# Patient Record
Sex: MALE | Race: WHITE | NOT HISPANIC OR LATINO | Employment: OTHER | URBAN - METROPOLITAN AREA
[De-identification: names, ages, dates, MRNs, and addresses within clinical notes are randomized per-mention and may not be internally consistent; named-entity substitution may affect disease eponyms.]

---

## 2017-03-13 RX ORDER — LISINOPRIL AND HYDROCHLOROTHIAZIDE 25; 20 MG/1; MG/1
TABLET ORAL
Qty: 90 TABLET | Refills: 0 | Status: SHIPPED | OUTPATIENT
Start: 2017-03-13 | End: 2017-06-18 | Stop reason: SDUPTHER

## 2017-03-30 RX ORDER — PRAVASTATIN SODIUM 20 MG
TABLET ORAL
Qty: 90 TABLET | Refills: 0 | Status: SHIPPED | OUTPATIENT
Start: 2017-03-30 | End: 2017-06-28 | Stop reason: SDUPTHER

## 2017-06-19 DIAGNOSIS — E11.9 TYPE 2 DIABETES MELLITUS WITHOUT COMPLICATION, WITHOUT LONG-TERM CURRENT USE OF INSULIN (HCC): ICD-10-CM

## 2017-06-19 DIAGNOSIS — I10 ESSENTIAL HYPERTENSION: ICD-10-CM

## 2017-06-19 DIAGNOSIS — E78.5 HYPERLIPIDEMIA, UNSPECIFIED HYPERLIPIDEMIA TYPE: ICD-10-CM

## 2017-06-19 RX ORDER — LISINOPRIL AND HYDROCHLOROTHIAZIDE 25; 20 MG/1; MG/1
TABLET ORAL
Qty: 90 TABLET | Refills: 0 | Status: SHIPPED | OUTPATIENT
Start: 2017-06-19 | End: 2017-09-17 | Stop reason: SDUPTHER

## 2017-06-21 LAB
ALBUMIN SERPL-MCNC: 4.5 G/DL (ref 3.5–5.2)
ALBUMIN/GLOB SERPL: 1.5 G/DL
ALP SERPL-CCNC: 38 U/L (ref 39–117)
ALT SERPL-CCNC: 34 U/L (ref 1–41)
AST SERPL-CCNC: 25 U/L (ref 1–40)
BASOPHILS # BLD AUTO: 0.02 10*3/MM3 (ref 0–0.2)
BASOPHILS NFR BLD AUTO: 0.4 % (ref 0–1.5)
BILIRUB SERPL-MCNC: 0.8 MG/DL (ref 0.1–1.2)
BUN SERPL-MCNC: 19 MG/DL (ref 8–23)
BUN/CREAT SERPL: 14.4 (ref 7–25)
CALCIUM SERPL-MCNC: 9.6 MG/DL (ref 8.6–10.5)
CHLORIDE SERPL-SCNC: 99 MMOL/L (ref 98–107)
CHOLEST SERPL-MCNC: 130 MG/DL (ref 100–199)
CO2 SERPL-SCNC: 27.5 MMOL/L (ref 22–29)
CREAT SERPL-MCNC: 1.32 MG/DL (ref 0.76–1.27)
EOSINOPHIL # BLD AUTO: 0.13 10*3/MM3 (ref 0–0.7)
EOSINOPHIL NFR BLD AUTO: 2.5 % (ref 0.3–6.2)
ERYTHROCYTE [DISTWIDTH] IN BLOOD BY AUTOMATED COUNT: 13.2 % (ref 11.5–14.5)
GLOBULIN SER CALC-MCNC: 3 GM/DL
GLUCOSE SERPL-MCNC: 118 MG/DL (ref 65–99)
HBA1C MFR BLD: 6.16 % (ref 4.8–5.6)
HCT VFR BLD AUTO: 44.6 % (ref 40.4–52.2)
HDL SERPL-SCNC: 35 UMOL/L
HDLC SERPL-MCNC: 40 MG/DL
HGB BLD-MCNC: 15.3 G/DL (ref 13.7–17.6)
IMM GRANULOCYTES # BLD: 0 10*3/MM3 (ref 0–0.03)
IMM GRANULOCYTES NFR BLD: 0 % (ref 0–0.5)
LDL SERPL QN: 20.2 NM
LDL SERPL-SCNC: 830 NMOL/L
LDL SMALL SERPL-SCNC: 526 NMOL/L
LDLC SERPL CALC-MCNC: 57 MG/DL (ref 0–99)
LYMPHOCYTES # BLD AUTO: 1.91 10*3/MM3 (ref 0.9–4.8)
LYMPHOCYTES NFR BLD AUTO: 37.1 % (ref 19.6–45.3)
MCH RBC QN AUTO: 31 PG (ref 27–32.7)
MCHC RBC AUTO-ENTMCNC: 34.3 G/DL (ref 32.6–36.4)
MCV RBC AUTO: 90.5 FL (ref 79.8–96.2)
MONOCYTES # BLD AUTO: 0.56 10*3/MM3 (ref 0.2–1.2)
MONOCYTES NFR BLD AUTO: 10.9 % (ref 5–12)
NEUTROPHILS # BLD AUTO: 2.53 10*3/MM3 (ref 1.9–8.1)
NEUTROPHILS NFR BLD AUTO: 49.1 % (ref 42.7–76)
PLATELET # BLD AUTO: 318 10*3/MM3 (ref 140–500)
POTASSIUM SERPL-SCNC: 4.1 MMOL/L (ref 3.5–5.2)
PROT SERPL-MCNC: 7.5 G/DL (ref 6–8.5)
RBC # BLD AUTO: 4.93 10*6/MM3 (ref 4.6–6)
SODIUM SERPL-SCNC: 140 MMOL/L (ref 136–145)
T4 FREE SERPL-MCNC: 1.05 NG/DL (ref 0.93–1.7)
TRIGL SERPL-MCNC: 163 MG/DL (ref 0–149)
TSH SERPL DL<=0.005 MIU/L-ACNC: 5.61 MIU/ML (ref 0.27–4.2)
WBC # BLD AUTO: 5.15 10*3/MM3 (ref 4.5–10.7)

## 2017-06-27 ENCOUNTER — OFFICE VISIT (OUTPATIENT)
Dept: FAMILY MEDICINE CLINIC | Facility: CLINIC | Age: 72
End: 2017-06-27

## 2017-06-27 VITALS
HEIGHT: 72 IN | SYSTOLIC BLOOD PRESSURE: 146 MMHG | WEIGHT: 225.2 LBS | RESPIRATION RATE: 18 BRPM | HEART RATE: 70 BPM | BODY MASS INDEX: 30.5 KG/M2 | DIASTOLIC BLOOD PRESSURE: 70 MMHG

## 2017-06-27 DIAGNOSIS — E11.9 TYPE 2 DIABETES MELLITUS WITHOUT COMPLICATION, WITHOUT LONG-TERM CURRENT USE OF INSULIN (HCC): ICD-10-CM

## 2017-06-27 DIAGNOSIS — R79.89 SERUM CREATININE RAISED: ICD-10-CM

## 2017-06-27 DIAGNOSIS — E78.5 HYPERLIPIDEMIA, UNSPECIFIED HYPERLIPIDEMIA TYPE: ICD-10-CM

## 2017-06-27 DIAGNOSIS — I10 ESSENTIAL HYPERTENSION: Primary | ICD-10-CM

## 2017-06-27 PROCEDURE — 99214 OFFICE O/P EST MOD 30 MIN: CPT | Performed by: INTERNAL MEDICINE

## 2017-06-27 NOTE — PROGRESS NOTES
Subjective   Frank J Collesano is a 72 y.o. male. Patient is here today for   Chief Complaint   Patient presents with   • Diabetes   • Hyperlipidemia   • Hypertension          Vitals:    06/27/17 0949   BP: 146/70   Pulse: 70   Resp: 18       The following portions of the patient's history were reviewed and updated as appropriate: allergies, current medications, past family history, past medical history, past social history, past surgical history and problem list.    Past Medical History:   Diagnosis Date   • Diabetes mellitus    • Hyperlipidemia    • Hypertension       No Known Allergies   Social History     Social History   • Marital status: Single     Spouse name: N/A   • Number of children: N/A   • Years of education: N/A     Occupational History   • Not on file.     Social History Main Topics   • Smoking status: Never Smoker   • Smokeless tobacco: Not on file   • Alcohol use Yes   • Drug use: Not on file   • Sexual activity: Not on file     Other Topics Concern   • Not on file     Social History Narrative        Current Outpatient Prescriptions:   •  lisinopril-hydrochlorothiazide (PRINZIDE,ZESTORETIC) 20-25 MG per tablet, TAKE 1 TABLET DAILY, Disp: 90 tablet, Rfl: 0  •  metFORMIN (GLUCOPHAGE) 500 MG tablet, TAKE 1 TABLET EVERY 12 HOURS WITH FOOD, Disp: 180 tablet, Rfl: 1  •  pravastatin (PRAVACHOL) 20 MG tablet, TAKE 1 TABLET EVERY NIGHT AT BEDTIME, Disp: 90 tablet, Rfl: 0     Objective   History of Present Illness Be is here for a blood pressure and lab follow-up.  He has hypertension, hyperlipidemia, elevated TSH, and type 2 diabetes mellitus.  He feels well as always.  He usually eats healthy and exercises almost on a daily basis.  He sprints an episode of chest pain when he was exercising on May 25 and presented to [facility].  A cardiovascular evaluation included a nuclear stress test which was normal.    Review of Systems   Eyes:        Eye exam in October   Respiratory: Negative.     Cardiovascular:        As in HPI   Neurological: Negative for light-headedness and numbness.   Psychiatric/Behavioral: Negative.        Physical Exam   Constitutional: He appears well-developed and well-nourished.   Neck: No thyromegaly present.   Cardiovascular: Normal rate, regular rhythm and normal heart sounds.    122/68   Pulmonary/Chest: Effort normal and breath sounds normal.   Musculoskeletal: He exhibits no edema.   Neurological: He is alert.   Psychiatric: He has a normal mood and affect.   Vitals reviewed.      ASSESSMENT     Problem List Items Addressed This Visit        Cardiovascular and Mediastinum    Hyperlipidemia    Hypertension - Primary       Endocrine    Diabetes mellitus       Other    Serum creatinine raised          PLAN  Patient Instructions   Your blood pressure is stable.  Fasting blood sugar is mildly elevated at 118 and hemoglobin A1c has decreased to 6.16.  Total cholesterol is 1:30 and triglycerides are slightly elevated at 163.  LDL concentration is ideal at 830.  Thyroid-stimulating hormone level is mildly increased at 5.61 and free T4 is normal at 1.05.  Reviewed and discussed your recent cardiovascular evaluation.  Continue current diet, exercise, and medicines and follow-up in 6 months with labs.      Return in about 6 months (around 12/27/2017) for labsBMP,A1C,URINE MA,TSH.

## 2017-06-27 NOTE — PATIENT INSTRUCTIONS
Your blood pressure is stable.  Fasting blood sugar is mildly elevated at 118 and hemoglobin A1c has decreased to 6.16.  Total cholesterol is 1:30 and triglycerides are slightly elevated at 163.  LDL concentration is ideal at 830.  Thyroid-stimulating hormone level is mildly increased at 5.61 and free T4 is normal at 1.05.  Reviewed and discussed your recent cardiovascular evaluation.  Continue current diet, exercise, and medicines and follow-up in 6 months with labs.

## 2017-06-28 RX ORDER — PRAVASTATIN SODIUM 20 MG
TABLET ORAL
Qty: 90 TABLET | Refills: 1 | Status: SHIPPED | OUTPATIENT
Start: 2017-06-28 | End: 2017-12-25 | Stop reason: SDUPTHER

## 2017-09-18 RX ORDER — LISINOPRIL AND HYDROCHLOROTHIAZIDE 25; 20 MG/1; MG/1
TABLET ORAL
Qty: 90 TABLET | Refills: 0 | Status: SHIPPED | OUTPATIENT
Start: 2017-09-18 | End: 2017-12-17 | Stop reason: SDUPTHER

## 2017-12-14 DIAGNOSIS — I10 ESSENTIAL HYPERTENSION: ICD-10-CM

## 2017-12-14 DIAGNOSIS — E11.9 TYPE 2 DIABETES MELLITUS WITHOUT COMPLICATION, WITHOUT LONG-TERM CURRENT USE OF INSULIN (HCC): ICD-10-CM

## 2017-12-18 RX ORDER — LISINOPRIL AND HYDROCHLOROTHIAZIDE 25; 20 MG/1; MG/1
TABLET ORAL
Qty: 90 TABLET | Refills: 0 | Status: SHIPPED | OUTPATIENT
Start: 2017-12-18 | End: 2018-03-17 | Stop reason: SDUPTHER

## 2017-12-21 LAB
BUN SERPL-MCNC: 19 MG/DL (ref 8–23)
BUN/CREAT SERPL: 14.8 (ref 7–25)
CALCIUM SERPL-MCNC: 9.7 MG/DL (ref 8.6–10.5)
CHLORIDE SERPL-SCNC: 100 MMOL/L (ref 98–107)
CO2 SERPL-SCNC: 29.1 MMOL/L (ref 22–29)
CREAT SERPL-MCNC: 1.28 MG/DL (ref 0.76–1.27)
GLUCOSE SERPL-MCNC: 136 MG/DL (ref 65–99)
HBA1C MFR BLD: 6.38 % (ref 4.8–5.6)
MICROALBUMIN UR-MCNC: 8.4 UG/ML
POTASSIUM SERPL-SCNC: 4.3 MMOL/L (ref 3.5–5.2)
SODIUM SERPL-SCNC: 142 MMOL/L (ref 136–145)
TSH SERPL DL<=0.005 MIU/L-ACNC: 1.66 MIU/ML (ref 0.27–4.2)

## 2017-12-26 RX ORDER — PRAVASTATIN SODIUM 20 MG
TABLET ORAL
Qty: 90 TABLET | Refills: 1 | Status: SHIPPED | OUTPATIENT
Start: 2017-12-26 | End: 2018-06-26 | Stop reason: SDUPTHER

## 2017-12-28 ENCOUNTER — OFFICE VISIT (OUTPATIENT)
Dept: FAMILY MEDICINE CLINIC | Facility: CLINIC | Age: 72
End: 2017-12-28

## 2017-12-28 VITALS
WEIGHT: 225 LBS | HEART RATE: 74 BPM | BODY MASS INDEX: 30.48 KG/M2 | DIASTOLIC BLOOD PRESSURE: 70 MMHG | SYSTOLIC BLOOD PRESSURE: 120 MMHG | HEIGHT: 72 IN | RESPIRATION RATE: 16 BRPM

## 2017-12-28 DIAGNOSIS — E78.5 HYPERLIPIDEMIA, UNSPECIFIED HYPERLIPIDEMIA TYPE: Primary | ICD-10-CM

## 2017-12-28 DIAGNOSIS — I10 ESSENTIAL HYPERTENSION: ICD-10-CM

## 2017-12-28 DIAGNOSIS — E11.9 TYPE 2 DIABETES MELLITUS WITHOUT COMPLICATION, WITHOUT LONG-TERM CURRENT USE OF INSULIN (HCC): ICD-10-CM

## 2017-12-28 PROCEDURE — 99214 OFFICE O/P EST MOD 30 MIN: CPT | Performed by: INTERNAL MEDICINE

## 2017-12-28 NOTE — PATIENT INSTRUCTIONS
Blood pressure is stable.  Fasting blood sugar is 136 and hemoglobin A1c is 6.38.  Serum creatinine is slightly elevated at 1.28 and GFR is 55.  Urine for microalbumin is normal.  Thyroid-stimulating hormone level is normal.  We'll continue current diet, exercise, and medicines.  Discussed importance of getting annual flu vaccinations.

## 2017-12-28 NOTE — PROGRESS NOTES
Subjective   Frank J Collesano is a 72 y.o. male. Patient is here today for   Chief Complaint   Patient presents with   • Diabetes   • Hypertension          Vitals:    12/28/17 0843   BP: 120/70   Pulse: 74   Resp: 16       The following portions of the patient's history were reviewed and updated as appropriate: allergies, current medications, past family history, past medical history, past social history, past surgical history and problem list.    Past Medical History:   Diagnosis Date   • Diabetes mellitus    • Hyperlipidemia    • Hypertension       No Known Allergies   Social History     Social History   • Marital status: Single     Spouse name: N/A   • Number of children: N/A   • Years of education: N/A     Occupational History   • Not on file.     Social History Main Topics   • Smoking status: Never Smoker   • Smokeless tobacco: Not on file   • Alcohol use Yes   • Drug use: Not on file   • Sexual activity: Not on file     Other Topics Concern   • Not on file     Social History Narrative        Current Outpatient Prescriptions:   •  lisinopril-hydrochlorothiazide (PRINZIDE,ZESTORETIC) 20-25 MG per tablet, TAKE 1 TABLET DAILY, Disp: 90 tablet, Rfl: 0  •  metFORMIN (GLUCOPHAGE) 500 MG tablet, TAKE 1 TABLET EVERY 12 HOURS WITH FOOD, Disp: 180 tablet, Rfl: 1  •  pravastatin (PRAVACHOL) 20 MG tablet, TAKE 1 TABLET EVERY NIGHT AT BEDTIME, Disp: 90 tablet, Rfl: 1     Objective   History of Present Illness Be is here for a blood pressure and lab follow-up.  He has hypertension, hyperlipidemia, and type 2 diabetes mellitus.  He eats very healthy and exercises 6 days a week vigorously.  His weight has been stable.  He feels well.  He does monitor his blood pressure reports normal readings.  He refuses flu vaccination.  Other immunizations are up-to-date.    Review of Systems   Constitutional: Negative for activity change and unexpected weight change.   Eyes:        Eye exam is up-to-date   Respiratory: Negative.     Gastrointestinal:        Colonoscopy 2014   Genitourinary: Negative.    Neurological: Negative for weakness and numbness.       Physical Exam   Constitutional: He appears well-developed and well-nourished.   Neck: No thyromegaly present.   Cardiovascular: Normal rate, regular rhythm and normal heart sounds.    Pulmonary/Chest: Effort normal and breath sounds normal.    Diabetic foot exam performed: absent vibratory sensation.    Vascular Status -  His exam exhibits right foot vasculature normal. His exam exhibits left foot vasculature normal.   Skin Integrity  -  His right foot skin is intact.     Be 's left foot skin is intact. .  Psychiatric: He has a normal mood and affect.   Vitals reviewed.      ASSESSMENT     Problem List Items Addressed This Visit        Cardiovascular and Mediastinum    Hyperlipidemia - Primary    Hypertension       Endocrine    Diabetes mellitus          PLAN  Patient Instructions   Blood pressure is stable.  Fasting blood sugar is 136 and hemoglobin A1c is 6.38.  Serum creatinine is slightly elevated at 1.28 and GFR is 55.  Urine for microalbumin is normal.  Thyroid-stimulating hormone level is normal.  We'll continue current diet, exercise, and medicines.  Discussed importance of getting annual flu vaccinations.      Return in about 6 months (around 6/28/2018) for labsCBC,CMP,LIPID,A1C.

## 2018-01-09 ENCOUNTER — OFFICE VISIT (OUTPATIENT)
Dept: FAMILY MEDICINE CLINIC | Facility: CLINIC | Age: 73
End: 2018-01-09

## 2018-01-09 VITALS
SYSTOLIC BLOOD PRESSURE: 119 MMHG | HEART RATE: 81 BPM | TEMPERATURE: 97.3 F | HEIGHT: 72 IN | RESPIRATION RATE: 16 BRPM | OXYGEN SATURATION: 99 % | BODY MASS INDEX: 30.48 KG/M2 | DIASTOLIC BLOOD PRESSURE: 85 MMHG | WEIGHT: 225 LBS

## 2018-01-09 DIAGNOSIS — Z00.00 INITIAL MEDICARE ANNUAL WELLNESS VISIT: Primary | ICD-10-CM

## 2018-01-09 PROCEDURE — G0438 PPPS, INITIAL VISIT: HCPCS | Performed by: NURSE PRACTITIONER

## 2018-01-09 NOTE — PROGRESS NOTES
QUICK REFERENCE INFORMATION:  The ABCs of the Annual Wellness Visit    Initial Medicare Wellness Visit    HEALTH RISK ASSESSMENT    1945    Recent Hospitalizations:  No hospitalization(s) within the last year..        Current Medical Providers:  Patient Care Team:  Garret Dumont MD as PCP - General        Smoking Status:  History   Smoking Status   • Never Smoker   Smokeless Tobacco   • Not on file       Alcohol Consumption:  History   Alcohol Use   • Yes       Depression Screen:   PHQ-2/PHQ-9 Depression Screening 1/9/2018   Little interest or pleasure in doing things 0   Feeling down, depressed, or hopeless 0   Trouble falling or staying asleep, or sleeping too much 0   Feeling tired or having little energy 0   Poor appetite or overeating 0   Feeling bad about yourself - or that you are a failure or have let yourself or your family down 0   Trouble concentrating on things, such as reading the newspaper or watching television 0   Moving or speaking so slowly that other people could have noticed. Or the opposite - being so fidgety or restless that you have been moving around a lot more than usual 0   Thoughts that you would be better off dead, or of hurting yourself in some way 0   Total Score 0   If you checked off any problems, how difficult have these problems made it for you to do your work, take care of things at home, or get along with other people? Not difficult at all       Health Habits and Functional and Cognitive Screening:  Functional & Cognitive Status 1/9/2018   Do you have difficulty preparing food and eating? No   Do you have difficulty bathing yourself, getting dressed or grooming yourself? No   Do you have difficulty using the toilet? No   Do you have difficulty moving around from place to place? No   Do you have trouble with steps or getting out of a bed or a chair? No   In the past year have you fallen or experienced a near fall? No   Current Diet Well Balanced Diet   Dental Exam Up to  date   Eye Exam Up to date   Exercise (times per week) 6 times per week   Current Exercise Activities Include Bicycling Outdoors   Do you need help using the phone?  No   Are you deaf or do you have serious difficulty hearing?  No   Do you need help with transportation? No   Do you need help shopping? No   Do you need help preparing meals?  No   Do you need help with housework?  No   Do you need help with laundry? No   Do you need help taking your medications? No   Do you need help managing money? No   Have you felt unusual stress, anger or loneliness in the last month? No   Who do you live with? Spouse   If you need help, do you have trouble finding someone available to you? No   Have you been bothered in the last four weeks by sexual problems? No   Do you have difficulty concentrating, remembering or making decisions? No           Does the patient have evidence of cognitive impairment? No              Visual Acuity:  No exam data present had eye exam in oct 2017     Age-appropriate Screening Schedule:  Refer to the list below for future screening recommendations based on patient's age, sex and/or medical conditions. Orders for these recommended tests are listed in the plan section. The patient has been provided with a written plan.    Health Maintenance   Topic Date Due   • DIABETIC FOOT EXAM  06/15/2016   • LIPID PANEL  06/20/2018   • HEMOGLOBIN A1C  06/20/2018   • DIABETIC EYE EXAM  10/01/2018   • URINE MICROALBUMIN  12/20/2018   • TDAP/TD VACCINES (2 - Td) 07/07/2024   • COLONOSCOPY  10/15/2024   • INFLUENZA VACCINE  Addressed   • ZOSTER VACCINE  Completed   • PNEUMOCOCCAL VACCINES (65+ LOW/MEDIUM RISK)  Excluded        Subjective   History of Present Illness    Frank J Collesano is a 72 y.o. male who presents for an Annual Wellness Visit.    The following portions of the patient's history were reviewed and updated as appropriate: allergies, current medications, past family history, past medical history, past  "social history, past surgical history and problem list.    Outpatient Medications Prior to Visit   Medication Sig Dispense Refill   • lisinopril-hydrochlorothiazide (PRINZIDE,ZESTORETIC) 20-25 MG per tablet TAKE 1 TABLET DAILY 90 tablet 0   • metFORMIN (GLUCOPHAGE) 500 MG tablet TAKE 1 TABLET EVERY 12 HOURS WITH FOOD 180 tablet 1   • pravastatin (PRAVACHOL) 20 MG tablet TAKE 1 TABLET EVERY NIGHT AT BEDTIME 90 tablet 1     No facility-administered medications prior to visit.        Patient Active Problem List   Diagnosis   • Diabetes mellitus   • Serum creatinine raised   • Hyperlipidemia   • Hypertension   • Suprapubic abdominal pain       Advance Care Planning:  has an advance directive - a copy has been provided and is in file    Identification of Risk Factors:  Risk factors include: cardiovascular risk.    Review of Systems    Compared to one year ago, the patient feels his physical health is the same.  Compared to one year ago, the patient feels his mental health is the same.    Objective     Physical Exam    Vitals:    01/09/18 1326   BP: 119/85   Pulse: 81   Resp: 16   Temp: 97.3 °F (36.3 °C)   TempSrc: Oral   SpO2: 99%   Weight: 102 kg (225 lb)   Height: 182.9 cm (72.01\")   PainSc: 0-No pain       Body mass index is 30.51 kg/(m^2).  Discussed the patient's BMI with him. BMI is above normal parameters. Follow-up plan includes:  no follow-up required.    Assessment/Plan   Patient Self-Management and Personalized Health Advice  The patient has been provided with information about: diet, exercise and prevention of cardiac or vascular disease and preventive services including:   · vascular screenings.         Visit Diagnoses:    ICD-10-CM ICD-9-CM   1. Initial Medicare annual wellness visit Z00.00 V70.0       No orders of the defined types were placed in this encounter.      Outpatient Encounter Prescriptions as of 1/9/2018   Medication Sig Dispense Refill   • lisinopril-hydrochlorothiazide (PRINZIDE,ZESTORETIC) " 20-25 MG per tablet TAKE 1 TABLET DAILY 90 tablet 0   • metFORMIN (GLUCOPHAGE) 500 MG tablet TAKE 1 TABLET EVERY 12 HOURS WITH FOOD 180 tablet 1   • pravastatin (PRAVACHOL) 20 MG tablet TAKE 1 TABLET EVERY NIGHT AT BEDTIME 90 tablet 1     No facility-administered encounter medications on file as of 1/9/2018.        Reviewed use of high risk medication in the elderly: not applicable  Reviewed for potential of harmful drug interactions in the elderly: not applicable    Follow Up:  Follow up as scheduled in June     An After Visit Summary and PPPS with all of these plans were given to the patient.

## 2018-03-19 RX ORDER — LISINOPRIL AND HYDROCHLOROTHIAZIDE 25; 20 MG/1; MG/1
TABLET ORAL
Qty: 90 TABLET | Refills: 0 | Status: SHIPPED | OUTPATIENT
Start: 2018-03-19 | End: 2018-06-16 | Stop reason: SDUPTHER

## 2018-04-23 ENCOUNTER — OFFICE VISIT (OUTPATIENT)
Dept: FAMILY MEDICINE CLINIC | Facility: CLINIC | Age: 73
End: 2018-04-23

## 2018-04-23 VITALS
SYSTOLIC BLOOD PRESSURE: 188 MMHG | RESPIRATION RATE: 18 BRPM | HEIGHT: 72 IN | HEART RATE: 102 BPM | BODY MASS INDEX: 30.61 KG/M2 | WEIGHT: 226 LBS | DIASTOLIC BLOOD PRESSURE: 90 MMHG

## 2018-04-23 DIAGNOSIS — I10 ESSENTIAL HYPERTENSION: Primary | ICD-10-CM

## 2018-04-23 PROCEDURE — 99214 OFFICE O/P EST MOD 30 MIN: CPT | Performed by: INTERNAL MEDICINE

## 2018-04-23 RX ORDER — AMLODIPINE BESYLATE 2.5 MG/1
2.5 TABLET ORAL DAILY
Qty: 30 TABLET | Refills: 5 | Status: SHIPPED | OUTPATIENT
Start: 2018-04-23 | End: 2018-06-26 | Stop reason: SDUPTHER

## 2018-04-23 NOTE — PROGRESS NOTES
Subjective   Frank J Collesano is a 73 y.o. male. Patient is here today for   Chief Complaint   Patient presents with   • Hypertension     BP Check, has been elevated           Vitals:    04/23/18 1528   BP: (!) 188/90   Pulse: 102   Resp: 18     The following portions of the patient's history were reviewed and updated as appropriate: allergies, current medications, past family history, past medical history, past social history, past surgical history and problem list.    Past Medical History:   Diagnosis Date   • Diabetes mellitus    • Hyperlipidemia    • Hypertension       No Known Allergies   Social History     Social History   • Marital status: Single     Spouse name: N/A   • Number of children: N/A   • Years of education: N/A     Occupational History   • Not on file.     Social History Main Topics   • Smoking status: Never Smoker   • Smokeless tobacco: Not on file   • Alcohol use Yes   • Drug use: Unknown   • Sexual activity: Not on file     Other Topics Concern   • Not on file     Social History Narrative   • No narrative on file        Current Outpatient Prescriptions:   •  lisinopril-hydrochlorothiazide (PRINZIDE,ZESTORETIC) 20-25 MG per tablet, TAKE 1 TABLET DAILY, Disp: 90 tablet, Rfl: 0  •  metFORMIN (GLUCOPHAGE) 500 MG tablet, TAKE 1 TABLET EVERY 12 HOURS WITH FOOD, Disp: 180 tablet, Rfl: 1  •  pravastatin (PRAVACHOL) 20 MG tablet, TAKE 1 TABLET EVERY NIGHT AT BEDTIME, Disp: 90 tablet, Rfl: 1  •  amLODIPine (NORVASC) 2.5 MG tablet, Take 1 tablet by mouth Daily., Disp: 30 tablet, Rfl: 5     Objective     History of Present Illness Be has hypertension and is on lisinopril-hydrochlorothiazide 20-25 milligrams daily.  He had an abscess tooth in February and was taking a lot of ibuprofen for about 2 weeks.  His blood pressure and the dentist office was over 200 systolic.  He was in a lot of pain.  Since then he hasn't monitoring his blood pressure closely and reports systolic readings in the 140s up to  160s.  He eats very healthy and continues to exercise regularly.    Review of Systems   Constitutional: Negative for activity change and unexpected weight change.   Respiratory: Negative.    Cardiovascular: Negative for chest pain and leg swelling.   Neurological: Negative for headaches.       Physical Exam   Constitutional: He appears well-developed and well-nourished.   Cardiovascular: Normal rate, regular rhythm and normal heart sounds.    165/80, 160/80   Pulmonary/Chest: Effort normal and breath sounds normal.   Neurological: He is alert.   Psychiatric: He has a normal mood and affect. His behavior is normal. Judgment and thought content normal.   Vitals reviewed.      ASSESSMENT     Problem List Items Addressed This Visit        Cardiovascular and Mediastinum    Hypertension - Primary    Relevant Medications    amLODIPine (NORVASC) 2.5 MG tablet    Other Relevant Orders    Basic Metabolic Panel      Other Visit Diagnoses    None.         PLAN  Patient Instructions   Will check renal functions today.  Continue lisinopril-HCTZ at current dose.  Start amlodipine 2.5 mg daily.  If blood pressure consistently as above 140 increase amlodipine to 5 mg daily.  Continue current diet and exercise.    Return in about 1 week (around 4/30/2018) for Recheck.

## 2018-04-23 NOTE — PATIENT INSTRUCTIONS
Will check renal functions today.  Continue lisinopril-HCTZ at current dose.  Start amlodipine 2.5 mg daily.  If blood pressure consistently as above 140 increase amlodipine to 5 mg daily.  Continue current diet and exercise.

## 2018-04-24 LAB
BUN SERPL-MCNC: 18 MG/DL (ref 8–23)
BUN/CREAT SERPL: 13.7 (ref 7–25)
CALCIUM SERPL-MCNC: 9.6 MG/DL (ref 8.6–10.5)
CHLORIDE SERPL-SCNC: 97 MMOL/L (ref 98–107)
CO2 SERPL-SCNC: 28 MMOL/L (ref 22–29)
CREAT SERPL-MCNC: 1.31 MG/DL (ref 0.76–1.27)
GFR SERPLBLD CREATININE-BSD FMLA CKD-EPI: 54 ML/MIN/1.73
GFR SERPLBLD CREATININE-BSD FMLA CKD-EPI: 65 ML/MIN/1.73
GLUCOSE SERPL-MCNC: 176 MG/DL (ref 65–99)
POTASSIUM SERPL-SCNC: 3.8 MMOL/L (ref 3.5–5.2)
SODIUM SERPL-SCNC: 139 MMOL/L (ref 136–145)

## 2018-04-27 ENCOUNTER — TELEPHONE (OUTPATIENT)
Dept: FAMILY MEDICINE CLINIC | Facility: CLINIC | Age: 73
End: 2018-04-27

## 2018-04-27 NOTE — TELEPHONE ENCOUNTER
CALLED PATIENT, PER DR HOPKINS, LABS LOOK GOOD.       ----- Message from Mary Emmanuel sent at 4/26/2018  2:41 PM EDT -----  WANTING LAB RESULTS     PLEASE CALL [phone]

## 2018-04-30 ENCOUNTER — OFFICE VISIT (OUTPATIENT)
Dept: FAMILY MEDICINE CLINIC | Facility: CLINIC | Age: 73
End: 2018-04-30

## 2018-04-30 VITALS
DIASTOLIC BLOOD PRESSURE: 70 MMHG | RESPIRATION RATE: 16 BRPM | WEIGHT: 221 LBS | BODY MASS INDEX: 29.93 KG/M2 | SYSTOLIC BLOOD PRESSURE: 132 MMHG | HEIGHT: 72 IN | HEART RATE: 68 BPM

## 2018-04-30 DIAGNOSIS — I10 ESSENTIAL HYPERTENSION: Primary | ICD-10-CM

## 2018-04-30 PROCEDURE — 99213 OFFICE O/P EST LOW 20 MIN: CPT | Performed by: INTERNAL MEDICINE

## 2018-04-30 NOTE — PROGRESS NOTES
Subjective   Frank J Collesano is a 73 y.o. male. Patient is here today for   Chief Complaint   Patient presents with   • Hypertension          Vitals:    04/30/18 0948   BP: 132/70   Pulse: 68   Resp: 16     The following portions of the patient's history were reviewed and updated as appropriate: allergies, current medications, past family history, past medical history, past social history, past surgical history and problem list.    Past Medical History:   Diagnosis Date   • Diabetes mellitus    • Hyperlipidemia    • Hypertension       No Known Allergies   Social History     Social History   • Marital status: Single     Spouse name: N/A   • Number of children: N/A   • Years of education: N/A     Occupational History   • Not on file.     Social History Main Topics   • Smoking status: Never Smoker   • Smokeless tobacco: Not on file   • Alcohol use Yes   • Drug use: Unknown   • Sexual activity: Not on file     Other Topics Concern   • Not on file     Social History Narrative   • No narrative on file        Current Outpatient Prescriptions:   •  amLODIPine (NORVASC) 2.5 MG tablet, Take 1 tablet by mouth Daily., Disp: 30 tablet, Rfl: 5  •  lisinopril-hydrochlorothiazide (PRINZIDE,ZESTORETIC) 20-25 MG per tablet, TAKE 1 TABLET DAILY, Disp: 90 tablet, Rfl: 0  •  metFORMIN (GLUCOPHAGE) 500 MG tablet, TAKE 1 TABLET EVERY 12 HOURS WITH FOOD, Disp: 180 tablet, Rfl: 1  •  pravastatin (PRAVACHOL) 20 MG tablet, TAKE 1 TABLET EVERY NIGHT AT BEDTIME, Disp: 90 tablet, Rfl: 1     Objective     History of Present Illness Be is here for blood pressure check.  He has hypertension and is on lisinopril-HCTZ 20-25 milligrams daily.  His blood pressure elevated last week and he was also placed on amlodipine 2.5 mg daily.  Blood pressures improved.  He continues to exercise on a daily basis.    Review of Systems   Constitutional:        5 lb weight loss   Respiratory: Negative.    Neurological: Negative for headaches.       Physical Exam    Constitutional: He appears well-developed and well-nourished.   Cardiovascular: Normal rate, regular rhythm and normal heart sounds.    121/77   Neurological: He is alert.   Psychiatric: He has a normal mood and affect.   Vitals reviewed.      ASSESSMENT     Problem List Items Addressed This Visit        Cardiovascular and Mediastinum    Hypertension - Primary      Other Visit Diagnoses    None.         PLAN  Patient Instructions   Blood pressure has improved.  Continue to monitor blood pressure correctly at home and goal is less than 120/80.  Will increase amlodipine to 5 mg if indicated.  Discussed watching dietary sodium as well.    Return for Next scheduled follow up.

## 2018-06-14 DIAGNOSIS — E11.9 TYPE 2 DIABETES MELLITUS WITHOUT COMPLICATION, WITHOUT LONG-TERM CURRENT USE OF INSULIN (HCC): ICD-10-CM

## 2018-06-14 DIAGNOSIS — E78.5 HYPERLIPIDEMIA, UNSPECIFIED HYPERLIPIDEMIA TYPE: ICD-10-CM

## 2018-06-14 DIAGNOSIS — I10 ESSENTIAL HYPERTENSION: Primary | ICD-10-CM

## 2018-06-18 RX ORDER — LISINOPRIL AND HYDROCHLOROTHIAZIDE 25; 20 MG/1; MG/1
TABLET ORAL
Qty: 90 TABLET | Refills: 1 | Status: SHIPPED | OUTPATIENT
Start: 2018-06-18 | End: 2018-12-17

## 2018-06-19 LAB
ALBUMIN SERPL-MCNC: 4.5 G/DL (ref 3.5–5.2)
ALBUMIN/GLOB SERPL: 1.6 G/DL
ALP SERPL-CCNC: 38 U/L (ref 39–117)
ALT SERPL-CCNC: 19 U/L (ref 1–41)
AST SERPL-CCNC: 18 U/L (ref 1–40)
BASOPHILS # BLD AUTO: 0.01 10*3/MM3 (ref 0–0.2)
BASOPHILS NFR BLD AUTO: 0.2 % (ref 0–1.5)
BILIRUB SERPL-MCNC: 0.8 MG/DL (ref 0.1–1.2)
BUN SERPL-MCNC: 22 MG/DL (ref 8–23)
BUN/CREAT SERPL: 15.9 (ref 7–25)
CALCIUM SERPL-MCNC: 10.2 MG/DL (ref 8.6–10.5)
CHLORIDE SERPL-SCNC: 97 MMOL/L (ref 98–107)
CHOLEST SERPL-MCNC: 126 MG/DL (ref 0–200)
CO2 SERPL-SCNC: 28.6 MMOL/L (ref 22–29)
CREAT SERPL-MCNC: 1.38 MG/DL (ref 0.76–1.27)
EOSINOPHIL # BLD AUTO: 0.09 10*3/MM3 (ref 0–0.7)
EOSINOPHIL NFR BLD AUTO: 1.7 % (ref 0.3–6.2)
ERYTHROCYTE [DISTWIDTH] IN BLOOD BY AUTOMATED COUNT: 13.2 % (ref 11.5–14.5)
GFR SERPLBLD CREATININE-BSD FMLA CKD-EPI: 51 ML/MIN/1.73
GFR SERPLBLD CREATININE-BSD FMLA CKD-EPI: 61 ML/MIN/1.73
GLOBULIN SER CALC-MCNC: 2.9 GM/DL
GLUCOSE SERPL-MCNC: 116 MG/DL (ref 65–99)
HBA1C MFR BLD: 5.9 % (ref 4.8–5.6)
HCT VFR BLD AUTO: 46.5 % (ref 40.4–52.2)
HDLC SERPL-MCNC: 44 MG/DL (ref 40–60)
HGB BLD-MCNC: 15.2 G/DL (ref 13.7–17.6)
IMM GRANULOCYTES # BLD: 0 10*3/MM3 (ref 0–0.03)
IMM GRANULOCYTES NFR BLD: 0 % (ref 0–0.5)
LDLC SERPL CALC-MCNC: 56 MG/DL (ref 0–100)
LDLC/HDLC SERPL: 1.28 {RATIO}
LYMPHOCYTES # BLD AUTO: 1.9 10*3/MM3 (ref 0.9–4.8)
LYMPHOCYTES NFR BLD AUTO: 35.7 % (ref 19.6–45.3)
MCH RBC QN AUTO: 31 PG (ref 27–32.7)
MCHC RBC AUTO-ENTMCNC: 32.7 G/DL (ref 32.6–36.4)
MCV RBC AUTO: 94.9 FL (ref 79.8–96.2)
MONOCYTES # BLD AUTO: 0.63 10*3/MM3 (ref 0.2–1.2)
MONOCYTES NFR BLD AUTO: 11.8 % (ref 5–12)
NEUTROPHILS # BLD AUTO: 2.69 10*3/MM3 (ref 1.9–8.1)
NEUTROPHILS NFR BLD AUTO: 50.6 % (ref 42.7–76)
PLATELET # BLD AUTO: 290 10*3/MM3 (ref 140–500)
POTASSIUM SERPL-SCNC: 4.2 MMOL/L (ref 3.5–5.2)
PROT SERPL-MCNC: 7.4 G/DL (ref 6–8.5)
RBC # BLD AUTO: 4.9 10*6/MM3 (ref 4.6–6)
SODIUM SERPL-SCNC: 138 MMOL/L (ref 136–145)
TRIGL SERPL-MCNC: 128 MG/DL (ref 0–150)
VLDLC SERPL CALC-MCNC: 25.6 MG/DL (ref 5–40)
WBC # BLD AUTO: 5.32 10*3/MM3 (ref 4.5–10.7)

## 2018-06-26 ENCOUNTER — OFFICE VISIT (OUTPATIENT)
Dept: FAMILY MEDICINE CLINIC | Facility: CLINIC | Age: 73
End: 2018-06-26

## 2018-06-26 VITALS
HEIGHT: 72 IN | SYSTOLIC BLOOD PRESSURE: 130 MMHG | WEIGHT: 220 LBS | HEART RATE: 70 BPM | BODY MASS INDEX: 29.8 KG/M2 | DIASTOLIC BLOOD PRESSURE: 74 MMHG | RESPIRATION RATE: 18 BRPM

## 2018-06-26 DIAGNOSIS — E78.5 HYPERLIPIDEMIA, UNSPECIFIED HYPERLIPIDEMIA TYPE: Primary | ICD-10-CM

## 2018-06-26 DIAGNOSIS — E11.9 TYPE 2 DIABETES MELLITUS WITHOUT COMPLICATION, WITHOUT LONG-TERM CURRENT USE OF INSULIN (HCC): ICD-10-CM

## 2018-06-26 DIAGNOSIS — I10 ESSENTIAL HYPERTENSION: ICD-10-CM

## 2018-06-26 PROCEDURE — 99214 OFFICE O/P EST MOD 30 MIN: CPT | Performed by: INTERNAL MEDICINE

## 2018-06-26 RX ORDER — PRAVASTATIN SODIUM 20 MG
20 TABLET ORAL
Qty: 90 TABLET | Refills: 3 | Status: SHIPPED | OUTPATIENT
Start: 2018-06-26 | End: 2019-07-29 | Stop reason: SDUPTHER

## 2018-06-26 RX ORDER — AMLODIPINE BESYLATE 2.5 MG/1
2.5 TABLET ORAL DAILY
Qty: 90 TABLET | Refills: 1 | Status: SHIPPED | OUTPATIENT
Start: 2018-06-26 | End: 2018-06-26 | Stop reason: SDUPTHER

## 2018-06-26 RX ORDER — AMLODIPINE BESYLATE 2.5 MG/1
2.5 TABLET ORAL DAILY
Qty: 90 TABLET | Refills: 3 | Status: SHIPPED | OUTPATIENT
Start: 2018-06-26 | End: 2018-12-17

## 2018-06-26 NOTE — PATIENT INSTRUCTIONS
Blood pressure is stable but suggest that she monitor it different times of the day.  Lipids are excellent.  Fasting blood sugar is mildly elevated at 116 and hemoglobin A1c has improved to 5.9.  Serum creatinine is 1.38.  Continue current diet, exercise, and medicines.

## 2018-06-26 NOTE — PROGRESS NOTES
Subjective   Frank J Collesano is a 73 y.o. male. Patient is here today for   Chief Complaint   Patient presents with   • Diabetes   • Hyperlipidemia   • Hypertension          Vitals:    06/26/18 0801   BP: 130/74   Pulse: 70   Resp: 18       The following portions of the patient's history were reviewed and updated as appropriate: allergies, current medications, past family history, past medical history, past social history, past surgical history and problem list.    Past Medical History:   Diagnosis Date   • Diabetes mellitus    • Hyperlipidemia    • Hypertension       No Known Allergies   Social History     Social History   • Marital status: Single     Spouse name: N/A   • Number of children: N/A   • Years of education: N/A     Occupational History   • Not on file.     Social History Main Topics   • Smoking status: Never Smoker   • Smokeless tobacco: Not on file   • Alcohol use Yes   • Drug use: Unknown   • Sexual activity: Not on file     Other Topics Concern   • Not on file     Social History Narrative   • No narrative on file        Current Outpatient Prescriptions:   •  amLODIPine (NORVASC) 2.5 MG tablet, Take 1 tablet by mouth Daily., Disp: 90 tablet, Rfl: 3  •  lisinopril-hydrochlorothiazide (PRINZIDE,ZESTORETIC) 20-25 MG per tablet, TAKE 1 TABLET DAILY, Disp: 90 tablet, Rfl: 1  •  metFORMIN (GLUCOPHAGE) 500 MG tablet, Take 1 tablet by mouth 2 (Two) Times a Day With Meals., Disp: 180 tablet, Rfl: 3  •  pravastatin (PRAVACHOL) 20 MG tablet, Take 1 tablet by mouth every night at bedtime., Disp: 90 tablet, Rfl: 3     Objective   History of Present Illness rate is here for a blood pressure and lab follow-up.  He has hypertension, hyperlipidemia, type 2 diabetes mellitus.  He feels well.  He eats very healthy and exercises on daily basis.  He has lost some weight in last 6 months.  He monitors his blood pressure reports most systolic readings in the 120s and diastolic readings less than 80.    Review of Systems    Constitutional:        Weight loss 5 lbs   Respiratory: Negative.    Cardiovascular: Negative.    Gastrointestinal: Negative.    Genitourinary: Negative.    Neurological: Negative.    Psychiatric/Behavioral: Negative.        Physical Exam   Constitutional: He appears well-developed and well-nourished.   Cardiovascular: Normal rate, regular rhythm and normal heart sounds.    130/74   Musculoskeletal: He exhibits no edema.   Vitals reviewed.      ASSESSMENT     Problem List Items Addressed This Visit        Cardiovascular and Mediastinum    Hyperlipidemia - Primary    Relevant Medications    pravastatin (PRAVACHOL) 20 MG tablet    Hypertension    Relevant Medications    amLODIPine (NORVASC) 2.5 MG tablet       Endocrine    Diabetes mellitus    Relevant Medications    metFORMIN (GLUCOPHAGE) 500 MG tablet          PLAN  Patient Instructions   Blood pressure is stable but suggest that she monitor it different times of the day.  Lipids are excellent.  Fasting blood sugar is mildly elevated at 116 and hemoglobin A1c has improved to 5.9.  Serum creatinine is 1.38.  Continue current diet, exercise, and medicines.      Return in about 6 months (around 12/26/2018) for labs CMP, A1c, urine MA, TSH, PSA.

## 2018-12-05 DIAGNOSIS — E78.5 HYPERLIPIDEMIA, UNSPECIFIED HYPERLIPIDEMIA TYPE: ICD-10-CM

## 2018-12-05 DIAGNOSIS — Z12.5 SCREENING PSA (PROSTATE SPECIFIC ANTIGEN): Primary | ICD-10-CM

## 2018-12-05 DIAGNOSIS — I10 ESSENTIAL HYPERTENSION: ICD-10-CM

## 2018-12-05 DIAGNOSIS — E11.9 TYPE 2 DIABETES MELLITUS WITHOUT COMPLICATION, WITHOUT LONG-TERM CURRENT USE OF INSULIN (HCC): ICD-10-CM

## 2018-12-11 LAB
ALBUMIN SERPL-MCNC: 4.6 G/DL (ref 3.5–5.2)
ALBUMIN/GLOB SERPL: 1.6 G/DL
ALP SERPL-CCNC: 42 U/L (ref 39–117)
ALT SERPL-CCNC: 29 U/L (ref 1–41)
AST SERPL-CCNC: 21 U/L (ref 1–40)
BILIRUB SERPL-MCNC: 0.6 MG/DL (ref 0.1–1.2)
BUN SERPL-MCNC: 19 MG/DL (ref 8–23)
BUN/CREAT SERPL: 13.8 (ref 7–25)
CALCIUM SERPL-MCNC: 9.9 MG/DL (ref 8.6–10.5)
CHLORIDE SERPL-SCNC: 99 MMOL/L (ref 98–107)
CO2 SERPL-SCNC: 30.3 MMOL/L (ref 22–29)
CREAT SERPL-MCNC: 1.38 MG/DL (ref 0.76–1.27)
GLOBULIN SER CALC-MCNC: 2.8 GM/DL
GLUCOSE SERPL-MCNC: 122 MG/DL (ref 65–99)
HBA1C MFR BLD: 6 % (ref 4.8–5.6)
MICROALBUMIN UR-MCNC: 7.7 UG/ML
POTASSIUM SERPL-SCNC: 4.5 MMOL/L (ref 3.5–5.2)
PROT SERPL-MCNC: 7.4 G/DL (ref 6–8.5)
PSA SERPL-MCNC: 1.12 NG/ML (ref 0–4)
SODIUM SERPL-SCNC: 139 MMOL/L (ref 136–145)
TSH SERPL DL<=0.005 MIU/L-ACNC: 2.59 MIU/ML (ref 0.27–4.2)

## 2018-12-17 ENCOUNTER — OFFICE VISIT (OUTPATIENT)
Dept: FAMILY MEDICINE CLINIC | Facility: CLINIC | Age: 73
End: 2018-12-17

## 2018-12-17 VITALS
SYSTOLIC BLOOD PRESSURE: 160 MMHG | BODY MASS INDEX: 30.2 KG/M2 | DIASTOLIC BLOOD PRESSURE: 82 MMHG | HEART RATE: 74 BPM | RESPIRATION RATE: 16 BRPM | WEIGHT: 223 LBS | HEIGHT: 72 IN

## 2018-12-17 DIAGNOSIS — I10 ESSENTIAL HYPERTENSION: Primary | ICD-10-CM

## 2018-12-17 DIAGNOSIS — E11.9 TYPE 2 DIABETES MELLITUS WITHOUT COMPLICATION, WITHOUT LONG-TERM CURRENT USE OF INSULIN (HCC): ICD-10-CM

## 2018-12-17 DIAGNOSIS — E78.5 HYPERLIPIDEMIA, UNSPECIFIED HYPERLIPIDEMIA TYPE: ICD-10-CM

## 2018-12-17 PROCEDURE — 99214 OFFICE O/P EST MOD 30 MIN: CPT | Performed by: INTERNAL MEDICINE

## 2018-12-17 RX ORDER — LISINOPRIL AND HYDROCHLOROTHIAZIDE 20; 12.5 MG/1; MG/1
1 TABLET ORAL 2 TIMES DAILY
Qty: 180 TABLET | Refills: 3 | Status: SHIPPED | OUTPATIENT
Start: 2018-12-17 | End: 2019-11-24 | Stop reason: SDUPTHER

## 2018-12-17 NOTE — PROGRESS NOTES
Subjective   Frank J Collesano is a 73 y.o. male. Patient is here today for   Chief Complaint   Patient presents with   • Hyperlipidemia   • Hypertension          Vitals:    12/17/18 0916   BP: 160/82   Pulse: 74   Resp: 16       The following portions of the patient's history were reviewed and updated as appropriate: allergies, current medications, past family history, past medical history, past social history, past surgical history and problem list.    Past Medical History:   Diagnosis Date   • Diabetes mellitus (CMS/HCC)    • Hyperlipidemia    • Hypertension       No Known Allergies   Social History     Socioeconomic History   • Marital status: Single     Spouse name: Not on file   • Number of children: Not on file   • Years of education: Not on file   • Highest education level: Not on file   Social Needs   • Financial resource strain: Not on file   • Food insecurity - worry: Not on file   • Food insecurity - inability: Not on file   • Transportation needs - medical: Not on file   • Transportation needs - non-medical: Not on file   Occupational History   • Not on file   Tobacco Use   • Smoking status: Never Smoker   Substance and Sexual Activity   • Alcohol use: Yes   • Drug use: Not on file   • Sexual activity: Not on file   Other Topics Concern   • Not on file   Social History Narrative   • Not on file        Current Outpatient Medications:   •  metFORMIN (GLUCOPHAGE) 500 MG tablet, Take 1 tablet by mouth 2 (Two) Times a Day With Meals., Disp: 180 tablet, Rfl: 3  •  pravastatin (PRAVACHOL) 20 MG tablet, Take 1 tablet by mouth every night at bedtime., Disp: 90 tablet, Rfl: 3  •  lisinopril-hydrochlorothiazide (PRINZIDE,ZESTORETIC) 20-12.5 MG per tablet, Take 1 tablet by mouth 2 (Two) Times a Day., Disp: 180 tablet, Rfl: 3     Objective   History of Present Illness Be is here for a blood pressure and lab follow-up.  He has hypertension, hyperlipidemia, and type 2 diabetes mellitus.  He feels well.  He eats  very healthy and exercises on a daily basis.  His weight is been stable.  He stopped amlodipine a few weeks ago.  His blood pressures have been high.  He continues on lisinopril-hydrochlorothiazide.    Review of Systems   Constitutional: Negative for activity change and unexpected weight change.   Respiratory: Negative.    Cardiovascular:        's/80's   Genitourinary: Negative.    Neurological: Negative.    Psychiatric/Behavioral: Negative.        Physical Exam   Constitutional: He appears well-developed and well-nourished.   Cardiovascular: Normal rate, regular rhythm and normal heart sounds.   158/76,140/80   Pulmonary/Chest: Effort normal and breath sounds normal.   Musculoskeletal: He exhibits no edema.   Neurological: He is alert.   Psychiatric: He has a normal mood and affect. His behavior is normal. Judgment and thought content normal.   Vitals reviewed.      ASSESSMENT     Problem List Items Addressed This Visit        Cardiovascular and Mediastinum    Hyperlipidemia    Hypertension - Primary    Relevant Medications    lisinopril-hydrochlorothiazide (PRINZIDE,ZESTORETIC) 20-12.5 MG per tablet       Endocrine    Diabetes mellitus (CMS/[facility])          PLAN  Patient Instructions   Blood pressures are high.  Will increase lisinopril-HCTZ to 20-12 0.5 mg twice a day.  Continue to monitor blood pressure correctly at home.  We will start another blood pressure medicine in addition to lisinopril-HCTZ if blood pressures remain elevated.  Fasting blood sugar is mildly elevated hemoglobin A1c is stable at 6.0.  Serum creatinine is unchanged.  Urine MA is normal.  Thyroid still running hormone level and prostate-specific antigen are normal.  Continue diet and exercise.      Return in about 6 months (around 6/17/2019) for labs CBC,CMP,LIPID,A1C.

## 2019-03-04 ENCOUNTER — OFFICE VISIT (OUTPATIENT)
Dept: FAMILY MEDICINE CLINIC | Facility: CLINIC | Age: 74
End: 2019-03-04

## 2019-03-04 VITALS
WEIGHT: 222 LBS | HEART RATE: 70 BPM | SYSTOLIC BLOOD PRESSURE: 138 MMHG | DIASTOLIC BLOOD PRESSURE: 80 MMHG | BODY MASS INDEX: 30.07 KG/M2 | HEIGHT: 72 IN | RESPIRATION RATE: 18 BRPM

## 2019-03-04 DIAGNOSIS — R10.30 LOWER ABDOMINAL PAIN: Primary | ICD-10-CM

## 2019-03-04 LAB
BILIRUB BLD-MCNC: NEGATIVE MG/DL
CLARITY, POC: CLEAR
COLOR UR: YELLOW
GLUCOSE UR STRIP-MCNC: NEGATIVE MG/DL
KETONES UR QL: NEGATIVE
LEUKOCYTE EST, POC: NEGATIVE
NITRITE UR-MCNC: NEGATIVE MG/ML
PH UR: 5.5 [PH] (ref 5–8)
PROT UR STRIP-MCNC: NEGATIVE MG/DL
RBC # UR STRIP: ABNORMAL /UL
SP GR UR: 1.02 (ref 1–1.03)
UROBILINOGEN UR QL: NORMAL

## 2019-03-04 PROCEDURE — 85025 COMPLETE CBC W/AUTO DIFF WBC: CPT | Performed by: INTERNAL MEDICINE

## 2019-03-04 PROCEDURE — 99214 OFFICE O/P EST MOD 30 MIN: CPT | Performed by: INTERNAL MEDICINE

## 2019-03-04 PROCEDURE — 81003 URINALYSIS AUTO W/O SCOPE: CPT | Performed by: INTERNAL MEDICINE

## 2019-03-04 NOTE — PATIENT INSTRUCTIONS
Complete blood count today is normal.  Urinalysis shows trace blood.  Will do a microscopic urinalysis.  If pain returns we will consider doing a CT of the abdomen and pelvis.

## 2019-03-04 NOTE — PROGRESS NOTES
Subjective   Frank J Collesano is a 74 y.o. male. Patient is here today for   Chief Complaint   Patient presents with   • Abdominal Pain     LOWER ABDOMEN. BETTER NOW.           Vitals:    03/04/19 0829   BP: 138/80   Pulse: 70   Resp: 18     The following portions of the patient's history were reviewed and updated as appropriate: allergies, current medications, past family history, past medical history, past social history, past surgical history and problem list.    Past Medical History:   Diagnosis Date   • Diabetes mellitus (CMS/[facility])    • Hyperlipidemia    • Hypertension       No Known Allergies   Social History     Socioeconomic History   • Marital status: Single     Spouse name: Not on file   • Number of children: Not on file   • Years of education: Not on file   • Highest education level: Not on file   Social Needs   • Financial resource strain: Not on file   • Food insecurity - worry: Not on file   • Food insecurity - inability: Not on file   • Transportation needs - medical: Not on file   • Transportation needs - non-medical: Not on file   Occupational History   • Not on file   Tobacco Use   • Smoking status: Never Smoker   Substance and Sexual Activity   • Alcohol use: Yes   • Drug use: Not on file   • Sexual activity: Not on file   Other Topics Concern   • Not on file   Social History Narrative   • Not on file        Current Outpatient Medications:   •  lisinopril-hydrochlorothiazide (PRINZIDE,ZESTORETIC) 20-12.5 MG per tablet, Take 1 tablet by mouth 2 (Two) Times a Day., Disp: 180 tablet, Rfl: 3  •  metFORMIN (GLUCOPHAGE) 500 MG tablet, Take 1 tablet by mouth 2 (Two) Times a Day With Meals., Disp: 180 tablet, Rfl: 3  •  pravastatin (PRAVACHOL) 20 MG tablet, Take 1 tablet by mouth every night at bedtime., Disp: 90 tablet, Rfl: 3     Objective     History of Present Illness Be complains of lower abdominal pain that started about a month ago when he was in [place].  The pain was worse when he moved.  The  pain severity was 6 out of 10 at its worst.  He denies fever or any change in his bowel movements.  He also had nocturia a few times last night.  He denies any dysuria or hesitancy.  Colonoscopy in 2014 showed some sigmoid diverticulosis and a few diverticuli in the ascending colon as well.    Review of Systems   Constitutional: Negative for fever.   Gastrointestinal: Positive for abdominal pain. Negative for blood in stool and constipation.   Genitourinary: Negative for difficulty urinating, dysuria, frequency, hematuria and urgency.   Neurological: Negative.    Psychiatric/Behavioral: Negative.        Physical Exam   Constitutional: He appears well-developed and well-nourished.   Abdominal: Soft. Bowel sounds are normal.   There is minimal tenderness of the lower abdomen.  There is no guarding or rebound.  There are no inguinal hernias.   Psychiatric: He has a normal mood and affect. His behavior is normal. Judgment and thought content normal.   Vitals reviewed.      ASSESSMENT     Problem List Items Addressed This Visit        Nervous and Auditory    Lower abdominal pain - Primary    Relevant Orders    POC CBC With / Auto Diff (Completed)    POC Urinalysis Dipstick, Automated (Completed)          PLAN  Patient Instructions   Complete blood count today is normal.  Urinalysis shows trace blood.  Will do a microscopic urinalysis.  If pain returns we will consider doing a CT of the abdomen and pelvis.    Return if symptoms worsen or fail to improve.

## 2019-06-14 DIAGNOSIS — E11.9 TYPE 2 DIABETES MELLITUS WITHOUT COMPLICATION, WITHOUT LONG-TERM CURRENT USE OF INSULIN (HCC): ICD-10-CM

## 2019-06-14 DIAGNOSIS — I10 ESSENTIAL HYPERTENSION: Primary | ICD-10-CM

## 2019-06-14 DIAGNOSIS — E78.5 HYPERLIPIDEMIA, UNSPECIFIED HYPERLIPIDEMIA TYPE: ICD-10-CM

## 2019-06-18 LAB
ALBUMIN SERPL-MCNC: 4.4 G/DL (ref 3.5–5.2)
ALBUMIN/GLOB SERPL: 1.5 G/DL
ALP SERPL-CCNC: 39 U/L (ref 39–117)
ALT SERPL-CCNC: 22 U/L (ref 1–41)
AST SERPL-CCNC: 17 U/L (ref 1–40)
BASOPHILS # BLD AUTO: 0.03 10*3/MM3 (ref 0–0.2)
BASOPHILS NFR BLD AUTO: 0.6 % (ref 0–1.5)
BILIRUB SERPL-MCNC: 0.8 MG/DL (ref 0.2–1.2)
BUN SERPL-MCNC: 20 MG/DL (ref 8–23)
BUN/CREAT SERPL: 15.9 (ref 7–25)
CALCIUM SERPL-MCNC: 9.9 MG/DL (ref 8.6–10.5)
CHLORIDE SERPL-SCNC: 98 MMOL/L (ref 98–107)
CHOLEST SERPL-MCNC: 126 MG/DL (ref 0–200)
CO2 SERPL-SCNC: 29.8 MMOL/L (ref 22–29)
CREAT SERPL-MCNC: 1.26 MG/DL (ref 0.76–1.27)
EOSINOPHIL # BLD AUTO: 0.13 10*3/MM3 (ref 0–0.4)
EOSINOPHIL NFR BLD AUTO: 2.7 % (ref 0.3–6.2)
ERYTHROCYTE [DISTWIDTH] IN BLOOD BY AUTOMATED COUNT: 13 % (ref 12.3–15.4)
GLOBULIN SER CALC-MCNC: 2.9 GM/DL
GLUCOSE SERPL-MCNC: 112 MG/DL (ref 65–99)
HBA1C MFR BLD: 6.3 % (ref 4.8–5.6)
HCT VFR BLD AUTO: 47.2 % (ref 37.5–51)
HDLC SERPL-MCNC: 42 MG/DL (ref 40–60)
HGB BLD-MCNC: 15.6 G/DL (ref 13–17.7)
IMM GRANULOCYTES # BLD AUTO: 0.01 10*3/MM3 (ref 0–0.05)
IMM GRANULOCYTES NFR BLD AUTO: 0.2 % (ref 0–0.5)
LDLC SERPL CALC-MCNC: 57 MG/DL (ref 0–100)
LDLC/HDLC SERPL: 1.35 {RATIO}
LYMPHOCYTES # BLD AUTO: 1.67 10*3/MM3 (ref 0.7–3.1)
LYMPHOCYTES NFR BLD AUTO: 34.4 % (ref 19.6–45.3)
MCH RBC QN AUTO: 31.3 PG (ref 26.6–33)
MCHC RBC AUTO-ENTMCNC: 33.1 G/DL (ref 31.5–35.7)
MCV RBC AUTO: 94.6 FL (ref 79–97)
MONOCYTES # BLD AUTO: 0.64 10*3/MM3 (ref 0.1–0.9)
MONOCYTES NFR BLD AUTO: 13.2 % (ref 5–12)
NEUTROPHILS # BLD AUTO: 2.37 10*3/MM3 (ref 1.7–7)
NEUTROPHILS NFR BLD AUTO: 48.9 % (ref 42.7–76)
NRBC BLD AUTO-RTO: 0 /100 WBC (ref 0–0.2)
PLATELET # BLD AUTO: 323 10*3/MM3 (ref 140–450)
POTASSIUM SERPL-SCNC: 4.5 MMOL/L (ref 3.5–5.2)
PROT SERPL-MCNC: 7.3 G/DL (ref 6–8.5)
RBC # BLD AUTO: 4.99 10*6/MM3 (ref 4.14–5.8)
SODIUM SERPL-SCNC: 139 MMOL/L (ref 136–145)
TRIGL SERPL-MCNC: 137 MG/DL (ref 0–150)
VLDLC SERPL CALC-MCNC: 27.4 MG/DL
WBC # BLD AUTO: 4.85 10*3/MM3 (ref 3.4–10.8)

## 2019-06-20 ENCOUNTER — OFFICE VISIT (OUTPATIENT)
Dept: FAMILY MEDICINE CLINIC | Facility: CLINIC | Age: 74
End: 2019-06-20

## 2019-06-20 VITALS
TEMPERATURE: 97.5 F | RESPIRATION RATE: 17 BRPM | HEART RATE: 69 BPM | OXYGEN SATURATION: 97 % | DIASTOLIC BLOOD PRESSURE: 84 MMHG | BODY MASS INDEX: 29.58 KG/M2 | HEIGHT: 72 IN | WEIGHT: 218.4 LBS | SYSTOLIC BLOOD PRESSURE: 134 MMHG

## 2019-06-20 DIAGNOSIS — R79.89 SERUM CREATININE RAISED: ICD-10-CM

## 2019-06-20 DIAGNOSIS — E11.9 TYPE 2 DIABETES MELLITUS WITHOUT COMPLICATION, WITHOUT LONG-TERM CURRENT USE OF INSULIN (HCC): ICD-10-CM

## 2019-06-20 DIAGNOSIS — I10 ESSENTIAL HYPERTENSION: Primary | ICD-10-CM

## 2019-06-20 DIAGNOSIS — E78.5 HYPERLIPIDEMIA, UNSPECIFIED HYPERLIPIDEMIA TYPE: ICD-10-CM

## 2019-06-20 PROCEDURE — 99214 OFFICE O/P EST MOD 30 MIN: CPT | Performed by: INTERNAL MEDICINE

## 2019-06-20 NOTE — PROGRESS NOTES
Subjective   Frank J Collesano is a 74 y.o. male. Patient is here today for   Chief Complaint   Patient presents with   • Follow-up     hypertension, hyperlipidemia, and diabetes.           Vitals:    06/20/19 1300   BP: 134/84   Pulse: 69   Resp: 17   Temp: 97.5 °F (36.4 °C)   SpO2: 97%       The following portions of the patient's history were reviewed and updated as appropriate: allergies, current medications, past family history, past medical history, past social history, past surgical history and problem list.    Past Medical History:   Diagnosis Date   • Diabetes mellitus (CMS/[facility])    • Hyperlipidemia    • Hypertension       No Known Allergies   Social History     Socioeconomic History   • Marital status: Single     Spouse name: Not on file   • Number of children: Not on file   • Years of education: Not on file   • Highest education level: Not on file   Tobacco Use   • Smoking status: Never Smoker   Substance and Sexual Activity   • Alcohol use: Yes        Current Outpatient Medications:   •  lisinopril-hydrochlorothiazide (PRINZIDE,ZESTORETIC) 20-12.5 MG per tablet, Take 1 tablet by mouth 2 (Two) Times a Day., Disp: 180 tablet, Rfl: 3  •  metFORMIN (GLUCOPHAGE) 500 MG tablet, Take 1 tablet by mouth 2 (Two) Times a Day With Meals., Disp: 180 tablet, Rfl: 3  •  pravastatin (PRAVACHOL) 20 MG tablet, Take 1 tablet by mouth every night at bedtime., Disp: 90 tablet, Rfl: 3     Objective   History of Present Illness Be is here for blood pressure lab follow-up.  He has hypertension, hyperlipidemia, type 2 diabetes mellitus.  He feels well.  He has not been eating as healthy as usual earlier this spring.  He continues to exercise vigorously almost on a daily basis.  He had a lot of allergy symptoms and cough and was taking Benadryl.  His symptoms have improved now.  He does have spring and fall allergies.  His weight is down some from 6 months ago.  He monitors his blood pressure and reports stable readings.  Eye  exams are up-to-date.    Review of Systems   Constitutional: Negative for activity change and unexpected weight change.   Respiratory: Negative.    Cardiovascular:        BP high 120-low 130/<80   Gastrointestinal: Negative.    Genitourinary: Negative.    Psychiatric/Behavioral: Negative.        Physical Exam   Constitutional: He appears well-developed and well-nourished.   Cardiovascular: Normal rate, regular rhythm and normal heart sounds.   118/60   Pulmonary/Chest: Effort normal and breath sounds normal.   Musculoskeletal: He exhibits no edema.   Neurological: He is alert.   Psychiatric: He has a normal mood and affect. His behavior is normal. Judgment and thought content normal.   Vitals reviewed.      ASSESSMENT     Problem List Items Addressed This Visit        Cardiovascular and Mediastinum    Hyperlipidemia    Hypertension - Primary       Endocrine    Diabetes mellitus (CMS/HCC)       Other    Serum creatinine raised          PLAN  Patient Instructions   Blood pressure is excellent today.  Discussed monitoring blood pressure correctly when rested and relaxed.  Fasting blood sugar is mildly elevated and hemoglobin A1c is 6.3.  Lipids are excellent.  Kidney and liver functions are normal.  A complete blood count is normal.  Continue exercise and current medicines.  Suggest taking cetirizine 10 mg daily when allergies symptoms return.      Return in about 6 months (around 12/20/2019) for labsCBC,BMp,A1C,URINE MA.

## 2019-06-20 NOTE — PATIENT INSTRUCTIONS
Blood pressure is excellent today.  Discussed monitoring blood pressure correctly when rested and relaxed.  Fasting blood sugar is mildly elevated and hemoglobin A1c is 6.3.  Lipids are excellent.  Kidney and liver functions are normal.  A complete blood count is normal.  Continue exercise and current medicines.  Suggest taking cetirizine 10 mg daily when allergies symptoms return.

## 2019-07-29 RX ORDER — PRAVASTATIN SODIUM 20 MG
TABLET ORAL
Qty: 90 TABLET | Refills: 3 | Status: SHIPPED | OUTPATIENT
Start: 2019-07-29 | End: 2020-07-23

## 2019-11-25 RX ORDER — LISINOPRIL AND HYDROCHLOROTHIAZIDE 20; 12.5 MG/1; MG/1
TABLET ORAL
Qty: 180 TABLET | Refills: 1 | Status: SHIPPED | OUTPATIENT
Start: 2019-11-25 | End: 2020-05-22

## 2019-12-16 DIAGNOSIS — E11.9 TYPE 2 DIABETES MELLITUS WITHOUT COMPLICATION, WITHOUT LONG-TERM CURRENT USE OF INSULIN (HCC): ICD-10-CM

## 2019-12-16 DIAGNOSIS — E78.5 HYPERLIPIDEMIA, UNSPECIFIED HYPERLIPIDEMIA TYPE: Primary | ICD-10-CM

## 2019-12-16 DIAGNOSIS — I10 ESSENTIAL HYPERTENSION: ICD-10-CM

## 2019-12-18 LAB
ALBUMIN/CREAT UR: 5.7 MG/G CREAT (ref 0–30)
BASOPHILS # BLD AUTO: 0.03 10*3/MM3 (ref 0–0.2)
BASOPHILS NFR BLD AUTO: 0.6 % (ref 0–1.5)
BUN SERPL-MCNC: 21 MG/DL (ref 8–23)
BUN/CREAT SERPL: 16 (ref 7–25)
CALCIUM SERPL-MCNC: 9.6 MG/DL (ref 8.6–10.5)
CHLORIDE SERPL-SCNC: 98 MMOL/L (ref 98–107)
CO2 SERPL-SCNC: 31.4 MMOL/L (ref 22–29)
CREAT SERPL-MCNC: 1.31 MG/DL (ref 0.76–1.27)
CREAT UR-MCNC: 159.1 MG/DL
EOSINOPHIL # BLD AUTO: 0.13 10*3/MM3 (ref 0–0.4)
EOSINOPHIL NFR BLD AUTO: 2.8 % (ref 0.3–6.2)
ERYTHROCYTE [DISTWIDTH] IN BLOOD BY AUTOMATED COUNT: 13 % (ref 12.3–15.4)
GLUCOSE SERPL-MCNC: 135 MG/DL (ref 65–99)
HBA1C MFR BLD: 6.6 % (ref 4.8–5.6)
HCT VFR BLD AUTO: 44.8 % (ref 37.5–51)
HGB BLD-MCNC: 15.5 G/DL (ref 13–17.7)
IMM GRANULOCYTES # BLD AUTO: 0.01 10*3/MM3 (ref 0–0.05)
IMM GRANULOCYTES NFR BLD AUTO: 0.2 % (ref 0–0.5)
LYMPHOCYTES # BLD AUTO: 1.8 10*3/MM3 (ref 0.7–3.1)
LYMPHOCYTES NFR BLD AUTO: 38.3 % (ref 19.6–45.3)
MCH RBC QN AUTO: 30.9 PG (ref 26.6–33)
MCHC RBC AUTO-ENTMCNC: 34.6 G/DL (ref 31.5–35.7)
MCV RBC AUTO: 89.4 FL (ref 79–97)
MICROALBUMIN UR-MCNC: 9.1 UG/ML
MONOCYTES # BLD AUTO: 0.66 10*3/MM3 (ref 0.1–0.9)
MONOCYTES NFR BLD AUTO: 14 % (ref 5–12)
NEUTROPHILS # BLD AUTO: 2.07 10*3/MM3 (ref 1.7–7)
NEUTROPHILS NFR BLD AUTO: 44.1 % (ref 42.7–76)
NRBC BLD AUTO-RTO: 0.2 /100 WBC (ref 0–0.2)
PLATELET # BLD AUTO: 324 10*3/MM3 (ref 140–450)
POTASSIUM SERPL-SCNC: 4.7 MMOL/L (ref 3.5–5.2)
RBC # BLD AUTO: 5.01 10*6/MM3 (ref 4.14–5.8)
SODIUM SERPL-SCNC: 141 MMOL/L (ref 136–145)
WBC # BLD AUTO: 4.7 10*3/MM3 (ref 3.4–10.8)

## 2019-12-20 ENCOUNTER — OFFICE VISIT (OUTPATIENT)
Dept: FAMILY MEDICINE CLINIC | Facility: CLINIC | Age: 74
End: 2019-12-20

## 2019-12-20 VITALS
TEMPERATURE: 97.2 F | BODY MASS INDEX: 31.08 KG/M2 | RESPIRATION RATE: 17 BRPM | HEIGHT: 71 IN | DIASTOLIC BLOOD PRESSURE: 70 MMHG | OXYGEN SATURATION: 98 % | WEIGHT: 222 LBS | HEART RATE: 77 BPM | SYSTOLIC BLOOD PRESSURE: 140 MMHG

## 2019-12-20 DIAGNOSIS — E78.5 HYPERLIPIDEMIA, UNSPECIFIED HYPERLIPIDEMIA TYPE: ICD-10-CM

## 2019-12-20 DIAGNOSIS — I10 ESSENTIAL HYPERTENSION: Primary | ICD-10-CM

## 2019-12-20 DIAGNOSIS — R79.89 SERUM CREATININE RAISED: ICD-10-CM

## 2019-12-20 DIAGNOSIS — E11.9 TYPE 2 DIABETES MELLITUS WITHOUT COMPLICATION, WITHOUT LONG-TERM CURRENT USE OF INSULIN (HCC): ICD-10-CM

## 2019-12-20 PROCEDURE — 99214 OFFICE O/P EST MOD 30 MIN: CPT | Performed by: INTERNAL MEDICINE

## 2019-12-20 NOTE — PATIENT INSTRUCTIONS
Blood pressure is stable.  Fasting blood sugar is elevated and hemoglobin A1c is stable at 6.6.  A complete blood count is normal.  Microalbumin creatinine ratio is normal.  Discussed diet, continue exercise and current medicines.

## 2019-12-20 NOTE — PROGRESS NOTES
Subjective   Frank J Collesano is a 74 y.o. male. Patient is here today for   Chief Complaint   Patient presents with   • Diabetes   • Hyperlipidemia   • Hypertension          Vitals:    12/20/19 0902   BP: 140/70   Pulse: 77   Resp: 17   Temp: 97.2 °F (36.2 °C)   SpO2: 98%     Body mass index is 30.96 kg/m².      The following portions of the patient's history were reviewed and updated as appropriate: allergies, current medications, past family history, past medical history, past social history, past surgical history and problem list.    Past Medical History:   Diagnosis Date   • Diabetes mellitus (CMS/[facility])    • Hyperlipidemia    • Hypertension       No Known Allergies   Social History     Socioeconomic History   • Marital status: Single     Spouse name: Not on file   • Number of children: Not on file   • Years of education: Not on file   • Highest education level: Not on file   Tobacco Use   • Smoking status: Never Smoker   Substance and Sexual Activity   • Alcohol use: Yes   • Drug use: Defer   • Sexual activity: Defer        Current Outpatient Medications:   •  lisinopril-hydrochlorothiazide (PRINZIDE,ZESTORETIC) 20-12.5 MG per tablet, TAKE 1 TABLET TWICE A DAY, Disp: 180 tablet, Rfl: 1  •  metFORMIN (GLUCOPHAGE) 500 MG tablet, Take 1 tablet by mouth 2 (Two) Times a Day With Meals., Disp: 180 tablet, Rfl: 3  •  pravastatin (PRAVACHOL) 20 MG tablet, TAKE 1 TABLET EVERY NIGHT AT BEDTIME, Disp: 90 tablet, Rfl: 3     Objective   History of Present Illness Be is here for blood pressure and lab follow-up.  He has hypertension, hyperlipidemia, and type 2 diabetes mellitus.  He feels well.  He has been eating some sugar.  He continues to exercise on a daily basis.  His weight is unchanged in last 6 months.  He has had some stress with friends passing away.  He monitors his blood pressure reports stable readings.  He had an eye exam in November.  He does not do Accu-Chek readings.  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   Eyes:        Exam in November   Respiratory: Negative.    Cardiovascular:        's/70's   Gastrointestinal: Negative.    Genitourinary: Negative.    Skin:        Annual exam   Neurological: Negative.    Psychiatric/Behavioral:        As in HPI       Physical Exam   Constitutional: He appears well-developed and well-nourished.   Cardiovascular: Normal rate, regular rhythm and normal heart sounds.   125/70   Pulmonary/Chest: Effort normal and breath sounds normal.   Musculoskeletal: He exhibits no edema.    Diabetic foot exam performed: Decreased vibratory sensation both feet.  Neurological: He is alert.   Psychiatric: He has a normal mood and affect. His behavior is normal. Judgment and thought content normal.   Vitals reviewed.      ASSESSMENT     Problem List Items Addressed This Visit        Cardiovascular and Mediastinum    Hyperlipidemia    Hypertension - Primary       Endocrine    Diabetes mellitus (CMS/HCC)       Other    Serum creatinine raised          PLAN  Patient Instructions   Blood pressure is stable.  Fasting blood sugar is elevated and hemoglobin A1c is stable at 6.6.  A complete blood count is normal.  Microalbumin creatinine ratio is normal.  Discussed diet, continue exercise and current medicines.        Return in about 6 months (around 6/20/2020) for labs PSA, CMP, lipid, A1c.

## 2020-05-22 RX ORDER — LISINOPRIL AND HYDROCHLOROTHIAZIDE 20; 12.5 MG/1; MG/1
TABLET ORAL
Qty: 180 TABLET | Refills: 3 | Status: SHIPPED | OUTPATIENT
Start: 2020-05-22 | End: 2021-06-01

## 2020-06-25 ENCOUNTER — PATIENT OUTREACH (OUTPATIENT)
Dept: CASE MANAGEMENT | Facility: OTHER | Age: 75
End: 2020-06-25

## 2020-06-25 NOTE — OUTREACH NOTE
LVM for pt to return call to schedule AWREYNALDO Granda  [facility] Clinical Coordinator  [phone]

## 2020-07-06 DIAGNOSIS — E11.9 TYPE 2 DIABETES MELLITUS WITHOUT COMPLICATION, WITHOUT LONG-TERM CURRENT USE OF INSULIN (HCC): Primary | ICD-10-CM

## 2020-07-06 DIAGNOSIS — I10 ESSENTIAL HYPERTENSION: ICD-10-CM

## 2020-07-06 DIAGNOSIS — Z12.5 ENCOUNTER FOR SCREENING FOR MALIGNANT NEOPLASM OF PROSTATE: ICD-10-CM

## 2020-07-06 DIAGNOSIS — E78.5 HYPERLIPIDEMIA, UNSPECIFIED HYPERLIPIDEMIA TYPE: ICD-10-CM

## 2020-07-20 ENCOUNTER — OFFICE VISIT (OUTPATIENT)
Dept: FAMILY MEDICINE CLINIC | Facility: CLINIC | Age: 75
End: 2020-07-20

## 2020-07-20 VITALS
SYSTOLIC BLOOD PRESSURE: 119 MMHG | RESPIRATION RATE: 18 BRPM | WEIGHT: 219.6 LBS | OXYGEN SATURATION: 94 % | BODY MASS INDEX: 30.74 KG/M2 | TEMPERATURE: 98.2 F | HEART RATE: 85 BPM | HEIGHT: 71 IN | DIASTOLIC BLOOD PRESSURE: 80 MMHG

## 2020-07-20 DIAGNOSIS — S96.911A STRAIN OF RIGHT ANKLE AND FOOT, INITIAL ENCOUNTER: ICD-10-CM

## 2020-07-20 DIAGNOSIS — M77.9 TENDONITIS: Primary | ICD-10-CM

## 2020-07-20 PROCEDURE — 99213 OFFICE O/P EST LOW 20 MIN: CPT | Performed by: NURSE PRACTITIONER

## 2020-07-20 NOTE — PATIENT INSTRUCTIONS
Pt to continue to ice foot/ankle 3 times a day for 10-15 minutes at a time until swelling resolved. Pt to continue to not over use right ankle. Pt informed to use soft ankle brace to help support and use as compression to area. Pt informed to keep elevated when possible. Pt informed of stretching exercises that can be done at home.     Pt returning to office in two weeks for AWV and labs, when he returns should add on uric acid level to evaluate for gout.

## 2020-07-20 NOTE — PROGRESS NOTES
"Frank J Collesano is a 75 y.o.. male.     Pt here today for c/o right ankle Swelling. Pt stating that since July 10 he has had swelling to right foot, ankle and post. Calf. Pt stating he had pain from his heel to ankle to post. Calf. Pt stating he has been using an Ice pack to area and taking Advil as needed. Pt stating that his pain is gone and his swelling is mostly gone now. Pt stating that this happens off and on through out year. Pt denies pain to bottom of foot. Pt denies any accident/trauma. Pt denies any numbness and/or tingling.       The following portions of the patient's history were reviewed and updated as appropriate: allergies, current medications, past family history, past medical history, past social history, past surgical history and problem list.    Past Medical History:   Diagnosis Date   • Diabetes mellitus (CMS/HCC)    • Hyperlipidemia    • Hypertension        Past Surgical History:   Procedure Laterality Date   • APPENDECTOMY     • COLONOSCOPY     • EYE SURGERY         Review of Systems   Constitutional: Negative.  Negative for fever.   Respiratory: Negative.    Cardiovascular: Negative.    Musculoskeletal: Positive for joint swelling (right ankle). Negative for gait problem.   Neurological: Negative for weakness and numbness.       No Known Allergies    Objective     Vitals:    07/20/20 1300   BP: 119/80   Pulse: 85   Resp: 18   Temp: 98.2 °F (36.8 °C)   SpO2: 94%   Weight: 99.6 kg (219 lb 9.6 oz)   Height: 180.3 cm (70.98\")     Body mass index is 30.64 kg/m².    Physical Exam   Constitutional: He is oriented to person, place, and time. He appears well-developed.   HENT:   Head: Normocephalic.   Eyes: Pupils are equal, round, and reactive to light.   Cardiovascular: Normal rate and regular rhythm.   Pulmonary/Chest: Effort normal and breath sounds normal.   Musculoskeletal: Normal range of motion.   Right ankle: trace edema noted, full ROM, no tenderness noted to palpation  Right foot: trace " edema noted, full ROM, no tenderness noted to palpation, cap refill wnl, no redness/warmth/swelling to big toe  Right calf: no tenderness noted, no swelling noted, no bruising noted, no redness or red streaks noted, no warmth noted   Neurological: He is alert and oriented to person, place, and time.   Vitals reviewed.      Current Outpatient Medications:   •  lisinopril-hydrochlorothiazide (PRINZIDE,ZESTORETIC) 20-12.5 MG per tablet, TAKE 1 TABLET TWICE A DAY, Disp: 180 tablet, Rfl: 3  •  metFORMIN (GLUCOPHAGE) 500 MG tablet, Take 1 tablet by mouth 2 (Two) Times a Day With Meals., Disp: 180 tablet, Rfl: 3  •  pravastatin (PRAVACHOL) 20 MG tablet, TAKE 1 TABLET EVERY NIGHT AT BEDTIME, Disp: 90 tablet, Rfl: 3      Assessment/Plan   Be was seen today for joint swelling.    Diagnoses and all orders for this visit:    Tendonitis    Strain of right ankle and foot, initial encounter        Patient Instructions   Pt to continue to ice foot/ankle 3 times a day for 10-15 minutes at a time until swelling resolved. Pt to continue to not over use right ankle. Pt informed to use soft ankle brace to help support and use as compression to area. Pt informed to keep elevated when possible. Pt informed of stretching exercises that can be done at home.     Pt returning to office in two weeks for AWV and labs, when he returns should add on uric acid level to evaluate for gout.       Return if symptoms worsen or fail to improve.     Subjective  Answers for HPI/ROS submitted by the patient on 7/16/2020   What is the primary reason for your visit?: Other  Please describe your symptoms.: Swollen ankle  Have you had these symptoms before?: Yes  How long have you been having these symptoms?: 5-7 days  Please list any medications you are currently taking for this condition.: Advil  Please describe any probable cause for these symptoms. : Achilles  tendon pull

## 2020-07-23 RX ORDER — PRAVASTATIN SODIUM 20 MG
TABLET ORAL
Qty: 90 TABLET | Refills: 3 | Status: SHIPPED | OUTPATIENT
Start: 2020-07-23 | End: 2021-08-31

## 2020-08-03 ENCOUNTER — OFFICE VISIT (OUTPATIENT)
Dept: FAMILY MEDICINE CLINIC | Facility: CLINIC | Age: 75
End: 2020-08-03

## 2020-08-03 VITALS
DIASTOLIC BLOOD PRESSURE: 78 MMHG | SYSTOLIC BLOOD PRESSURE: 120 MMHG | TEMPERATURE: 96.9 F | HEIGHT: 71 IN | WEIGHT: 218.8 LBS | BODY MASS INDEX: 30.63 KG/M2 | OXYGEN SATURATION: 98 % | HEART RATE: 67 BPM | RESPIRATION RATE: 16 BRPM

## 2020-08-03 DIAGNOSIS — Z00.00 MEDICARE ANNUAL WELLNESS VISIT, SUBSEQUENT: Primary | ICD-10-CM

## 2020-08-03 LAB
ALBUMIN SERPL-MCNC: 4.6 G/DL (ref 3.5–5.2)
ALBUMIN/GLOB SERPL: 1.6 G/DL
ALP SERPL-CCNC: 36 U/L (ref 39–117)
ALT SERPL-CCNC: 27 U/L (ref 1–41)
AST SERPL-CCNC: 22 U/L (ref 1–40)
BILIRUB SERPL-MCNC: 0.7 MG/DL (ref 0–1.2)
BUN SERPL-MCNC: 21 MG/DL (ref 8–23)
BUN/CREAT SERPL: 15.3 (ref 7–25)
CALCIUM SERPL-MCNC: 10.3 MG/DL (ref 8.6–10.5)
CHLORIDE SERPL-SCNC: 97 MMOL/L (ref 98–107)
CHOLEST SERPL-MCNC: 137 MG/DL (ref 0–200)
CO2 SERPL-SCNC: 29.8 MMOL/L (ref 22–29)
CREAT SERPL-MCNC: 1.37 MG/DL (ref 0.76–1.27)
GLOBULIN SER CALC-MCNC: 2.8 GM/DL
GLUCOSE SERPL-MCNC: 118 MG/DL (ref 65–99)
HBA1C MFR BLD: 6.3 % (ref 4.8–5.6)
HDLC SERPL-MCNC: 38 MG/DL (ref 40–60)
LDLC SERPL CALC-MCNC: 66 MG/DL (ref 0–100)
LDLC/HDLC SERPL: 1.74 {RATIO}
POTASSIUM SERPL-SCNC: 4.4 MMOL/L (ref 3.5–5.2)
PROT SERPL-MCNC: 7.4 G/DL (ref 6–8.5)
PSA SERPL-MCNC: 1.32 NG/ML (ref 0–4)
SODIUM SERPL-SCNC: 136 MMOL/L (ref 136–145)
TRIGL SERPL-MCNC: 164 MG/DL (ref 0–150)
VLDLC SERPL CALC-MCNC: 32.8 MG/DL (ref 5–40)

## 2020-08-03 PROCEDURE — G0439 PPPS, SUBSEQ VISIT: HCPCS | Performed by: NURSE PRACTITIONER

## 2020-08-03 NOTE — PROGRESS NOTES
The ABCs of the Annual Wellness Visit  Subsequent Medicare Wellness Visit    Chief Complaint   Patient presents with   • Medicare Wellness-subsequent       History of Present Illness:  Frank J Collesano is a 75 y.o. male who presents for a Subsequent Medicare Wellness Visit.  Subjective   HEALTH RISK ASSESSMENT    Recent Hospitalizations:  No hospitalization(s) within the last year.    Current Medical Providers:  Patient Care Team:  Garret Dumont MD as PCP - General    Smoking Status:  Social History     Tobacco Use   Smoking Status Never Smoker   Smokeless Tobacco Never Used       Alcohol Consumption:  Social History     Substance and Sexual Activity   Alcohol Use Yes   Alcohol rarely    Depression Screen:   PHQ-2/PHQ-9 Depression Screening 8/3/2020   Little interest or pleasure in doing things 0   Feeling down, depressed, or hopeless 0   Trouble falling or staying asleep, or sleeping too much -   Feeling tired or having little energy -   Poor appetite or overeating -   Feeling bad about yourself - or that you are a failure or have let yourself or your family down -   Trouble concentrating on things, such as reading the newspaper or watching television -   Moving or speaking so slowly that other people could have noticed. Or the opposite - being so fidgety or restless that you have been moving around a lot more than usual -   Thoughts that you would be better off dead, or of hurting yourself in some way -   Total Score 0   If you checked off any problems, how difficult have these problems made it for you to do your work, take care of things at home, or get along with other people? -       Fall Risk Screen:  STEADI Fall Risk Assessment was completed, and patient is at LOW risk for falls.Assessment completed on:8/3/2020    Health Habits and Functional and Cognitive Screening:  Functional & Cognitive Status 8/3/2020   Do you have difficulty preparing food and eating? No   Do you have difficulty bathing yourself,  getting dressed or grooming yourself? No   Do you have difficulty using the toilet? No   Do you have difficulty moving around from place to place? No   Do you have trouble with steps or getting out of a bed or a chair? No   Current Diet Well Balanced Diet   Dental Exam Up to date   Eye Exam Up to date   Exercise (times per week) 6 times per week   Current Exercise Activities Include Walking   Do you need help using the phone?  No   Are you deaf or do you have serious difficulty hearing?  No   Do you need help with transportation? No   Do you need help shopping? No   Do you need help preparing meals?  No   Do you need help with housework?  No   Do you need help with laundry? No   Do you need help taking your medications? No   Do you need help managing money? No   Do you ever drive or ride in a car without wearing a seat belt? No   Have you felt unusual stress, anger or loneliness in the last month? No   Who do you live with? Spouse   If you need help, do you have trouble finding someone available to you? No   Have you been bothered in the last four weeks by sexual problems? No   Do you have difficulty concentrating, remembering or making decisions? No       Does the patient have evidence of cognitive impairment? No    Asprin use counseling:Does not need ASA (and currently is not on it)    Age-appropriate Screening Schedule:  Refer to the list below for future screening recommendations based on patient's age, sex and/or medical conditions. Orders for these recommended tests are listed in the plan section. The patient has been provided with a written plan.    Health Maintenance   Topic Date Due   • ZOSTER VACCINE (2 of 3) 10/27/2014   • DIABETIC FOOT EXAM  12/28/2018   • LIPID PANEL  06/17/2020   • HEMOGLOBIN A1C  06/17/2020   • INFLUENZA VACCINE  08/01/2020   • DIABETIC EYE EXAM  11/06/2020   • URINE MICROALBUMIN  12/17/2020   • TDAP/TD VACCINES (2 - Td) 07/07/2024   • COLONOSCOPY  10/15/2024          The following  "portions of the patient's history were reviewed and updated as appropriate: allergies, current medications, past family history, past medical history, past social history, past surgical history and problem list.    Outpatient Medications Prior to Visit   Medication Sig Dispense Refill   • lisinopril-hydrochlorothiazide (PRINZIDE,ZESTORETIC) 20-12.5 MG per tablet TAKE 1 TABLET TWICE A  tablet 3   • metFORMIN (GLUCOPHAGE) 500 MG tablet Take 1 tablet by mouth 2 (Two) Times a Day With Meals. 180 tablet 3   • pravastatin (PRAVACHOL) 20 MG tablet TAKE 1 TABLET EVERY NIGHT AT BEDTIME 90 tablet 3     No facility-administered medications prior to visit.        Patient Active Problem List   Diagnosis   • Diabetes mellitus (CMS/[facility])   • Serum creatinine raised   • Hyperlipidemia   • Hypertension   • Suprapubic abdominal pain   • Lower abdominal pain       Advanced Care Planning:  ACP discussion was held with the patient during this visit. Patient has an advance directive (not in EMR), copy requested.    Review of Systems   Constitutional: Negative.    Respiratory: Negative.    Cardiovascular: Negative.    Musculoskeletal:        Feet/ankle swelling off and on, but better than previous       Compared to one year ago, the patient feels his physical health is worse.; feet issues  Compared to one year ago, the patient feels his mental health is better.    Reviewed chart for potential of high risk medication in the elderly: yes  Reviewed chart for potential of harmful drug interactions in the elderly:yes    Objective         Vitals:    08/03/20 0908   BP: 120/78   Pulse: 67   Resp: 16   Temp: 96.9 °F (36.1 °C)   TempSrc: Oral   SpO2: 98%   Weight: 99.2 kg (218 lb 12.8 oz)   Height: 180.3 cm (70.98\")       Body mass index is 30.53 kg/m².  Discussed the patient's BMI with him. The BMI is in the acceptable range.    Physical Exam   Constitutional: He is oriented to person, place, and time. He appears well-developed.   HENT: "   Head: Normocephalic.   Eyes: Pupils are equal, round, and reactive to light.   Cardiovascular: Normal rate and regular rhythm.   Pulmonary/Chest: Effort normal and breath sounds normal.   Musculoskeletal: Normal range of motion.   Neurological: He is alert and oriented to person, place, and time.   Vitals reviewed.            Assessment/Plan   Medicare Risks and Personalized Health Plan  CMS Preventative Services Quick Reference  Advance Directive Discussion  Immunizations Discussed/Encouraged (specific immunizations; Shingrix )    The above risks/problems have been discussed with the patient.  Pertinent information has been shared with the patient in the After Visit Summary.  Follow up plans and orders are seen below in the Assessment/Plan Section.    Diagnoses and all orders for this visit:    1. Medicare annual wellness visit, subsequent (Primary)      Follow Up:  Return for follow up with primary care provider as needed/as recommended .     An After Visit Summary and PPPS were given to the patient.

## 2020-08-10 ENCOUNTER — OFFICE VISIT (OUTPATIENT)
Dept: FAMILY MEDICINE CLINIC | Facility: CLINIC | Age: 75
End: 2020-08-10

## 2020-08-10 VITALS
TEMPERATURE: 97.5 F | RESPIRATION RATE: 17 BRPM | DIASTOLIC BLOOD PRESSURE: 80 MMHG | SYSTOLIC BLOOD PRESSURE: 130 MMHG | HEIGHT: 70 IN | WEIGHT: 218 LBS | OXYGEN SATURATION: 98 % | BODY MASS INDEX: 31.21 KG/M2 | HEART RATE: 74 BPM

## 2020-08-10 DIAGNOSIS — E78.5 HYPERLIPIDEMIA, UNSPECIFIED HYPERLIPIDEMIA TYPE: ICD-10-CM

## 2020-08-10 DIAGNOSIS — I10 ESSENTIAL HYPERTENSION: Primary | ICD-10-CM

## 2020-08-10 DIAGNOSIS — E11.9 TYPE 2 DIABETES MELLITUS WITHOUT COMPLICATION, WITHOUT LONG-TERM CURRENT USE OF INSULIN (HCC): ICD-10-CM

## 2020-08-10 DIAGNOSIS — R79.89 SERUM CREATININE RAISED: ICD-10-CM

## 2020-08-10 PROBLEM — R68.82 DECREASED LIBIDO: Status: ACTIVE | Noted: 2020-08-10

## 2020-08-10 PROCEDURE — 99214 OFFICE O/P EST MOD 30 MIN: CPT | Performed by: INTERNAL MEDICINE

## 2020-08-10 NOTE — PROGRESS NOTES
Subjective   Frank J Collesano is a 75 y.o. male. Patient is here today for   Chief Complaint   Patient presents with   • Hypertension   • Diabetes   • Hyperlipidemia          Vitals:    08/10/20 0911   BP: 130/80   Pulse: 74   Resp: 17   Temp: 97.5 °F (36.4 °C)   SpO2: 98%     Body mass index is 31.28 kg/m².      The following portions of the patient's history were reviewed and updated as appropriate: allergies, current medications, past family history, past medical history, past social history, past surgical history and problem list.    Past Medical History:   Diagnosis Date   • Diabetes mellitus (CMS/[facility])    • Hyperlipidemia    • Hypertension       No Known Allergies   Social History     Socioeconomic History   • Marital status: Single     Spouse name: Not on file   • Number of children: Not on file   • Years of education: Not on file   • Highest education level: Not on file   Tobacco Use   • Smoking status: Never Smoker   • Smokeless tobacco: Never Used   Substance and Sexual Activity   • Alcohol use: Yes   • Drug use: Defer   • Sexual activity: Defer        Current Outpatient Medications:   •  lisinopril-hydrochlorothiazide (PRINZIDE,ZESTORETIC) 20-12.5 MG per tablet, TAKE 1 TABLET TWICE A DAY, Disp: 180 tablet, Rfl: 3  •  metFORMIN (GLUCOPHAGE) 500 MG tablet, Take 1 tablet by mouth 2 (Two) Times a Day With Meals., Disp: 180 tablet, Rfl: 3  •  pravastatin (PRAVACHOL) 20 MG tablet, TAKE 1 TABLET EVERY NIGHT AT BEDTIME, Disp: 90 tablet, Rfl: 3     Objective   History of Present Illness Be is here for blood pressure check and lab follow-up.  He has hypertension, hyperlipidemia, type 2 diabetes mellitus.  He feels well.  He eats healthy and exercises 6 days a week.  His weight is stable.  He monitors his blood pressure and reports normal readings.  He does complain of decreased libido and wonders if anything can be done about it.  He has not had his testosterone levels checked in the past.  He has not had  vascular screening test recently.  Eye exams are up-to-date.    Review of Systems   Constitutional: Positive for activity change.   Respiratory: Negative.    Cardiovascular:         or less   Neurological: Negative.    Psychiatric/Behavioral: Negative.        Physical Exam   Constitutional: He appears well-developed and well-nourished.   Neck: Carotid bruit is not present.   Cardiovascular: Normal rate, regular rhythm and normal heart sounds.   Pulmonary/Chest: Effort normal and breath sounds normal.   Neurological: He is alert.   Psychiatric: He has a normal mood and affect. His behavior is normal. Judgment and thought content normal.   Vitals reviewed.      ASSESSMENT     Problem List Items Addressed This Visit        Cardiovascular and Mediastinum    Hyperlipidemia    Hypertension - Primary       Endocrine    Diabetes mellitus (CMS/HCC)       Other    Serum creatinine raised          PLAN  Patient Instructions   Blood pressure is well controlled.  Total and LDL cholesterol are normal.  HDL cholesterol is low at 38 and triglycerides are mildly elevated.  Fasting blood sugar is elevated and hemoglobin A1c has improved to 6.4.  Prostate-specific antigen is normal.  Suggest getting early morning testosterone level and repeating it if it is low.  Discussed options of testosterone replacement.  Continue diet, exercise, and current medicines.  You need vascular screening tests.      Return in about 6 months (around 2/10/2021) for labs CBC, CMP, lipid, A1c, microalbumin creatinine ratio, TSH, testosterone.  Answers for HPI/ROS submitted by the patient on 8/3/2020   What is the primary reason for your visit?: Physical

## 2020-08-10 NOTE — PATIENT INSTRUCTIONS
Blood pressure is well controlled.  Total and LDL cholesterol are normal.  HDL cholesterol is low at 38 and triglycerides are mildly elevated.  Fasting blood sugar is elevated and hemoglobin A1c has improved to 6.4.  Prostate-specific antigen is normal.  Suggest getting early morning testosterone level and repeating it if it is low.  Discussed options of testosterone replacement.  Continue diet, exercise, and current medicines.  You need vascular screening tests.

## 2020-08-14 DIAGNOSIS — R68.82 DECREASED LIBIDO: Primary | ICD-10-CM

## 2020-08-15 LAB — TESTOST SERPL-MCNC: 620 NG/DL (ref 264–916)

## 2021-02-15 ENCOUNTER — TELEPHONE (OUTPATIENT)
Dept: FAMILY MEDICINE CLINIC | Facility: CLINIC | Age: 76
End: 2021-02-15

## 2021-03-08 ENCOUNTER — TRANSCRIBE ORDERS (OUTPATIENT)
Dept: WOUND CARE | Facility: HOSPITAL | Age: 76
End: 2021-03-08

## 2021-03-08 ENCOUNTER — OFFICE VISIT (OUTPATIENT)
Dept: FAMILY MEDICINE CLINIC | Facility: CLINIC | Age: 76
End: 2021-03-08

## 2021-03-08 VITALS
WEIGHT: 221 LBS | HEIGHT: 70 IN | RESPIRATION RATE: 18 BRPM | SYSTOLIC BLOOD PRESSURE: 140 MMHG | HEART RATE: 83 BPM | OXYGEN SATURATION: 98 % | BODY MASS INDEX: 31.64 KG/M2 | TEMPERATURE: 97.1 F | DIASTOLIC BLOOD PRESSURE: 80 MMHG

## 2021-03-08 DIAGNOSIS — E11.9 TYPE 2 DIABETES MELLITUS WITHOUT COMPLICATION, WITHOUT LONG-TERM CURRENT USE OF INSULIN (HCC): ICD-10-CM

## 2021-03-08 DIAGNOSIS — Z13.6 ENCOUNTER FOR SCREENING FOR VASCULAR DISEASE: Primary | ICD-10-CM

## 2021-03-08 DIAGNOSIS — R79.89 SERUM CREATININE RAISED: ICD-10-CM

## 2021-03-08 DIAGNOSIS — E78.5 HYPERLIPIDEMIA, UNSPECIFIED HYPERLIPIDEMIA TYPE: ICD-10-CM

## 2021-03-08 DIAGNOSIS — I10 ESSENTIAL HYPERTENSION: Primary | ICD-10-CM

## 2021-03-08 PROCEDURE — 99214 OFFICE O/P EST MOD 30 MIN: CPT | Performed by: INTERNAL MEDICINE

## 2021-03-08 NOTE — PATIENT INSTRUCTIONS
Blood pressure is well controlled.  Hemoglobin A1c is 6.3 and unchanged from 6 months ago.  Kidney functions are normal.  Lipids are normal.  Complete blood count is normal.  Thyroid-stimulating hormone level is minimally elevated.  Continue diet, exercise, and current medicines.  We need to obtain emergency room records for review.

## 2021-03-08 NOTE — PROGRESS NOTES
Subjective   Frank J Collesano is a 76 y.o. male. Patient is here today for   Chief Complaint   Patient presents with   • Hypertension   • Med Refill   • Loss of Consciousness      dehydrated          Vitals:    03/08/21 1306   BP: 140/80   Pulse: 83   Resp: 18   Temp: 97.1 °F (36.2 °C)   SpO2: 98%     Body mass index is 31.71 kg/m².      The following portions of the patient's history were reviewed and updated as appropriate: allergies, current medications, past family history, past medical history, past social history, past surgical history and problem list.    Past Medical History:   Diagnosis Date   • Diabetes mellitus (CMS/[facility])    • Hyperlipidemia    • Hypertension       No Known Allergies   Social History     Socioeconomic History   • Marital status: Single     Spouse name: Not on file   • Number of children: Not on file   • Years of education: Not on file   • Highest education level: Not on file   Tobacco Use   • Smoking status: Never Smoker   • Smokeless tobacco: Never Used   Substance and Sexual Activity   • Alcohol use: Yes   • Drug use: Defer   • Sexual activity: Defer        Current Outpatient Medications:   •  lisinopril-hydrochlorothiazide (PRINZIDE,ZESTORETIC) 20-12.5 MG per tablet, TAKE 1 TABLET TWICE A DAY, Disp: 180 tablet, Rfl: 3  •  metFORMIN (GLUCOPHAGE) 500 MG tablet, TAKE 1 TABLET TWICE A DAY WITH MEALS, Disp: 180 tablet, Rfl: 3  •  pravastatin (PRAVACHOL) 20 MG tablet, TAKE 1 TABLET EVERY NIGHT AT BEDTIME, Disp: 90 tablet, Rfl: 3     Objective   History of Present Illness Be is here for blood pressure check and lab follow-up.  He has hypertension, type 2 diabetes mellitus, hyperlipidemia.  He feels well.  He continues to eat healthy and exercises almost on a daily basis.  His weight is stable.  He was in [place] and experienced a syncopal episode.  Emergency room told him he was dehydrated and since then he has been drinking 0 sugar Gatorade.  He states that he was bit by an ant on the  hand a few minutes prior to passing out.  Emergency room records are not available at present.    Review of Systems   Constitutional: Negative for activity change and unexpected weight change.   Eyes:        Exam in November   Respiratory: Negative for cough and shortness of breath.    Cardiovascular:        /80 or less   Gastrointestinal: Negative.    Genitourinary: Negative.    Neurological: Positive for syncope.   Psychiatric/Behavioral: Negative.        Physical Exam  Constitutional:       Appearance: Normal appearance.   Neck:      Vascular: No carotid bruit.   Cardiovascular:      Rate and Rhythm: Normal rate and regular rhythm.      Heart sounds: Normal heart sounds.      Comments: 140/80, 124/74  Pulmonary:      Effort: Pulmonary effort is normal.      Breath sounds: Normal breath sounds.   Musculoskeletal:      Right lower leg: No edema.      Left lower leg: No edema.   Neurological:      Mental Status: He is alert.   Psychiatric:         Mood and Affect: Mood normal.         Behavior: Behavior normal.         Thought Content: Thought content normal.         Judgment: Judgment normal.         ASSESSMENT     Problems Addressed this Visit        Cardiac and Vasculature    Hyperlipidemia    Hypertension - Primary       Endocrine and Metabolic    Diabetes mellitus (CMS/HCC)       Genitourinary and Reproductive     Serum creatinine raised      Diagnoses       Codes Comments    Essential hypertension    -  Primary ICD-10-CM: I10  ICD-9-CM: 401.9     Hyperlipidemia, unspecified hyperlipidemia type     ICD-10-CM: E78.5  ICD-9-CM: 272.4     Type 2 diabetes mellitus without complication, without long-term current use of insulin (CMS/[facility])     ICD-10-CM: E11.9  ICD-9-CM: 250.00     Serum creatinine raised     ICD-10-CM: R79.89  ICD-9-CM: 790.99           PLAN  There are no Patient Instructions on file for this visit.    No follow-ups on file.  Answers for HPI/ROS submitted by the patient on 3/5/2021  What is the  primary reason for your visit?: Physical

## 2021-04-05 ENCOUNTER — HOSPITAL ENCOUNTER (OUTPATIENT)
Dept: CARDIOLOGY | Facility: HOSPITAL | Age: 76
Discharge: HOME | End: 2021-04-05
Admitting: SURGERY

## 2021-04-05 VITALS
BODY MASS INDEX: 28.63 KG/M2 | SYSTOLIC BLOOD PRESSURE: 170 MMHG | DIASTOLIC BLOOD PRESSURE: 72 MMHG | WEIGHT: 216 LBS | HEIGHT: 73 IN | HEART RATE: 68 BPM

## 2021-04-05 DIAGNOSIS — Z13.6 ENCOUNTER FOR SCREENING FOR VASCULAR DISEASE: ICD-10-CM

## 2021-04-05 LAB
BH CV ECHO MEAS - DIST AO DIAM: 1.59 CM
BH CV VAS BP LEFT ARM: NORMAL MMHG
BH CV VAS BP RIGHT ARM: NORMAL MMHG
BH CV XLRA MEAS - MID AO DIAM: 1.8 CM
BH CV XLRA MEAS - PAD LEFT ABI DP: NORMAL
BH CV XLRA MEAS - PAD LEFT ABI PT: NORMAL
BH CV XLRA MEAS - PAD LEFT ARM: 170 MMHG
BH CV XLRA MEAS - PAD LEFT LEG DP: NORMAL MMHG
BH CV XLRA MEAS - PAD LEFT LEG PT: NORMAL MMHG
BH CV XLRA MEAS - PAD RIGHT ABI DP: NORMAL
BH CV XLRA MEAS - PAD RIGHT ABI PT: NORMAL
BH CV XLRA MEAS - PAD RIGHT ARM: 165 MMHG
BH CV XLRA MEAS - PAD RIGHT LEG DP: NORMAL MMHG
BH CV XLRA MEAS - PAD RIGHT LEG PT: NORMAL MMHG
BH CV XLRA MEAS - PROX AO DIAM: 2.44 CM
BH CV XLRA MEAS LEFT ICA/CCA RATIO: 1.14
BH CV XLRA MEAS LEFT MID CCA PSV: NORMAL CM/SEC
BH CV XLRA MEAS LEFT MID ICA PSV: NORMAL CM/SEC
BH CV XLRA MEAS LEFT PROX ECA PSV: NORMAL CM/SEC
BH CV XLRA MEAS RIGHT ICA/CCA RATIO: 1.05
BH CV XLRA MEAS RIGHT MID CCA PSV: NORMAL CM/SEC
BH CV XLRA MEAS RIGHT MID ICA PSV: NORMAL CM/SEC
BH CV XLRA MEAS RIGHT PROX ECA PSV: NORMAL CM/SEC

## 2021-04-05 PROCEDURE — 93799 UNLISTED CV SVC/PROCEDURE: CPT

## 2021-06-01 RX ORDER — LISINOPRIL AND HYDROCHLOROTHIAZIDE 20; 12.5 MG/1; MG/1
TABLET ORAL
Qty: 180 TABLET | Refills: 3 | Status: SHIPPED | OUTPATIENT
Start: 2021-06-01 | End: 2022-05-26

## 2021-08-31 RX ORDER — PRAVASTATIN SODIUM 20 MG
TABLET ORAL
Qty: 90 TABLET | Refills: 3 | Status: SHIPPED | OUTPATIENT
Start: 2021-08-31 | End: 2022-08-26

## 2021-09-09 DIAGNOSIS — Z00.00 WELLNESS EXAMINATION: ICD-10-CM

## 2021-09-09 DIAGNOSIS — Z12.5 ENCOUNTER FOR PROSTATE CANCER SCREENING: ICD-10-CM

## 2021-09-10 LAB
BASOPHILS # BLD AUTO: 0.04 10*3/MM3 (ref 0–0.2)
BASOPHILS NFR BLD AUTO: 0.8 % (ref 0–1.5)
BUN SERPL-MCNC: 19 MG/DL (ref 8–23)
BUN/CREAT SERPL: 15 (ref 7–25)
CALCIUM SERPL-MCNC: 10.1 MG/DL (ref 8.6–10.5)
CHLORIDE SERPL-SCNC: 99 MMOL/L (ref 98–107)
CHOLEST SERPL-MCNC: 140 MG/DL (ref 0–200)
CO2 SERPL-SCNC: 27.5 MMOL/L (ref 22–29)
CREAT SERPL-MCNC: 1.27 MG/DL (ref 0.76–1.27)
EOSINOPHIL # BLD AUTO: 0.1 10*3/MM3 (ref 0–0.4)
EOSINOPHIL NFR BLD AUTO: 1.9 % (ref 0.3–6.2)
ERYTHROCYTE [DISTWIDTH] IN BLOOD BY AUTOMATED COUNT: 12.1 % (ref 12.3–15.4)
GLUCOSE SERPL-MCNC: 116 MG/DL (ref 65–99)
HBA1C MFR BLD: 6.2 % (ref 4.8–5.6)
HCT VFR BLD AUTO: 44.9 % (ref 37.5–51)
HDLC SERPL-MCNC: 40 MG/DL (ref 40–60)
HGB BLD-MCNC: 15.1 G/DL (ref 13–17.7)
IMM GRANULOCYTES # BLD AUTO: 0.01 10*3/MM3 (ref 0–0.05)
IMM GRANULOCYTES NFR BLD AUTO: 0.2 % (ref 0–0.5)
LDLC SERPL CALC-MCNC: 73 MG/DL (ref 0–100)
LDLC/HDLC SERPL: 1.71 {RATIO}
LYMPHOCYTES # BLD AUTO: 1.88 10*3/MM3 (ref 0.7–3.1)
LYMPHOCYTES NFR BLD AUTO: 35.5 % (ref 19.6–45.3)
MCH RBC QN AUTO: 30.7 PG (ref 26.6–33)
MCHC RBC AUTO-ENTMCNC: 33.6 G/DL (ref 31.5–35.7)
MCV RBC AUTO: 91.3 FL (ref 79–97)
MONOCYTES # BLD AUTO: 0.68 10*3/MM3 (ref 0.1–0.9)
MONOCYTES NFR BLD AUTO: 12.9 % (ref 5–12)
NEUTROPHILS # BLD AUTO: 2.58 10*3/MM3 (ref 1.7–7)
NEUTROPHILS NFR BLD AUTO: 48.7 % (ref 42.7–76)
NRBC BLD AUTO-RTO: 0 /100 WBC (ref 0–0.2)
PLATELET # BLD AUTO: 328 10*3/MM3 (ref 140–450)
POTASSIUM SERPL-SCNC: 4.2 MMOL/L (ref 3.5–5.2)
PSA SERPL-MCNC: 1.11 NG/ML (ref 0–4)
RBC # BLD AUTO: 4.92 10*6/MM3 (ref 4.14–5.8)
SODIUM SERPL-SCNC: 138 MMOL/L (ref 136–145)
TRIGL SERPL-MCNC: 158 MG/DL (ref 0–150)
TSH SERPL DL<=0.005 MIU/L-ACNC: 4.59 UIU/ML (ref 0.27–4.2)
VLDLC SERPL CALC-MCNC: 27 MG/DL (ref 5–40)
WBC # BLD AUTO: 5.29 10*3/MM3 (ref 3.4–10.8)

## 2021-09-20 ENCOUNTER — OFFICE VISIT (OUTPATIENT)
Dept: FAMILY MEDICINE CLINIC | Facility: CLINIC | Age: 76
End: 2021-09-20

## 2021-09-20 VITALS
HEART RATE: 78 BPM | BODY MASS INDEX: 28.6 KG/M2 | HEIGHT: 73 IN | RESPIRATION RATE: 18 BRPM | WEIGHT: 215.8 LBS | TEMPERATURE: 96.6 F | DIASTOLIC BLOOD PRESSURE: 74 MMHG | OXYGEN SATURATION: 96 % | SYSTOLIC BLOOD PRESSURE: 132 MMHG

## 2021-09-20 DIAGNOSIS — I10 ESSENTIAL HYPERTENSION: Primary | ICD-10-CM

## 2021-09-20 DIAGNOSIS — E11.9 TYPE 2 DIABETES MELLITUS WITHOUT COMPLICATION, WITHOUT LONG-TERM CURRENT USE OF INSULIN (HCC): ICD-10-CM

## 2021-09-20 DIAGNOSIS — E78.5 HYPERLIPIDEMIA, UNSPECIFIED HYPERLIPIDEMIA TYPE: ICD-10-CM

## 2021-09-20 PROCEDURE — 99214 OFFICE O/P EST MOD 30 MIN: CPT | Performed by: INTERNAL MEDICINE

## 2021-09-20 NOTE — PROGRESS NOTES
Subjective   Frank J Collesano is a 76 y.o. male. Patient is here today for   Chief Complaint   Patient presents with   • Hyperlipidemia     6mo fu    • Diabetes     lab          Vitals:    09/20/21 1431   BP: 132/74   Pulse: 78   Resp: 18   Temp: 96.6 °F (35.9 °C)   SpO2: 96%     Body mass index is 28.85 kg/m².      The following portions of the patient's history were reviewed and updated as appropriate: allergies, current medications, past family history, past medical history, past social history, past surgical history and problem list.    Past Medical History:   Diagnosis Date   • Diabetes mellitus (CMS/[facility])    • Hyperlipidemia    • Hypertension       No Known Allergies   Social History     Socioeconomic History   • Marital status: Single     Spouse name: Not on file   • Number of children: Not on file   • Years of education: Not on file   • Highest education level: Not on file   Tobacco Use   • Smoking status: Never Smoker   • Smokeless tobacco: Never Used   Substance and Sexual Activity   • Alcohol use: Yes   • Drug use: Defer   • Sexual activity: Defer        Current Outpatient Medications:   •  lisinopril-hydrochlorothiazide (PRINZIDE,ZESTORETIC) 20-12.5 MG per tablet, TAKE 1 TABLET TWICE A DAY, Disp: 180 tablet, Rfl: 3  •  metFORMIN (GLUCOPHAGE) 500 MG tablet, TAKE 1 TABLET TWICE A DAY WITH MEALS, Disp: 180 tablet, Rfl: 3  •  pravastatin (PRAVACHOL) 20 MG tablet, TAKE 1 TABLET EVERY NIGHT AT BEDTIME, Disp: 90 tablet, Rfl: 3     Objective   History of Present Illness Be is here for blood pressure check and lab follow-up.  He has hypertension, hyperlipidemia, stable type 2 diabetes mellitus.  He feels well.  He eats very healthy and exercises vigorously on a daily basis.  He has lost weight in the last 6 months.  He monitors his blood pressure and reports normal readings.  He had vascular screening test earlier this year which showed some unilateral carotid artery plaque without stenosis.    Review of  Systems   Constitutional:        Weight loss 6 lbs   Respiratory: Negative.    Cardiovascular: Negative.    Gastrointestinal: Negative.    Genitourinary: Negative.    Musculoskeletal: Positive for back pain.   Neurological: Negative.    Psychiatric/Behavioral: Negative.        Physical Exam  Vitals reviewed.   Constitutional:       Appearance: Normal appearance.   Cardiovascular:      Rate and Rhythm: Normal rate and regular rhythm.      Heart sounds: Normal heart sounds.      Comments: 136/66  Pulmonary:      Effort: Pulmonary effort is normal.      Breath sounds: Normal breath sounds.   Musculoskeletal:      Right lower leg: No edema.      Left lower leg: No edema.   Neurological:      Mental Status: He is alert.   Psychiatric:         Mood and Affect: Mood normal.         Behavior: Behavior normal.         Thought Content: Thought content normal.         Judgment: Judgment normal.         ASSESSMENT blood pressure is high today but stable by your readings.  Lipids are normal.  Fasting glucose is mildly elevated hemoglobin A1c is stable at 6.2.  A complete blood count, kidney and liver functions, thyroid-stimulating hormone level, prostate-specific antigen are normal.  Continue diet, exercise, and current medicines.  Discussed vascular screening test.  Will order a coronary artery calcium CT.     Problems Addressed this Visit        Cardiac and Vasculature    Hyperlipidemia    Hypertension - Primary       Endocrine and Metabolic    Diabetes mellitus (CMS/[facility])      Diagnoses       Codes Comments    Essential hypertension    -  Primary ICD-10-CM: I10  ICD-9-CM: 401.9     Hyperlipidemia, unspecified hyperlipidemia type     ICD-10-CM: E78.5  ICD-9-CM: 272.4     Type 2 diabetes mellitus without complication, without long-term current use of insulin (CMS/[facility])     ICD-10-CM: E11.9  ICD-9-CM: 250.00           PLAN  There are no Patient Instructions on file for this visit.    No follow-ups on file.

## 2021-10-19 ENCOUNTER — TELEPHONE (OUTPATIENT)
Dept: FAMILY MEDICINE CLINIC | Facility: CLINIC | Age: 76
End: 2021-10-19

## 2021-10-19 NOTE — TELEPHONE ENCOUNTER
Caller: Collesano, Frank J    Relationship: Self    Best call back number: [phone]    What is the best time to reach you: ANYTIME    Who are you requesting to speak with (clinical staff, provider,  specific staff member):   CLINICAL STAFF/DR HOPKINS MEDICAL ASSISTANT    What was the call regarding: WOULD LIKE TO SPEAK WITH DR HOPKINS OR HIS MEDICAL ASSISTANT  ABOUT THE IMAGING SCAN HE HAS COMING UP NEXT WEEK.     Do you require a callback: YES

## 2022-03-14 DIAGNOSIS — I10 HYPERTENSION, UNSPECIFIED TYPE: ICD-10-CM

## 2022-03-14 DIAGNOSIS — E78.5 HYPERLIPIDEMIA, UNSPECIFIED HYPERLIPIDEMIA TYPE: Primary | ICD-10-CM

## 2022-03-14 DIAGNOSIS — E11.9 TYPE 2 DIABETES MELLITUS WITHOUT COMPLICATION, WITHOUT LONG-TERM CURRENT USE OF INSULIN: ICD-10-CM

## 2022-03-18 LAB
ALBUMIN/CREAT UR: 2 MG/G CREAT (ref 0–29)
BUN SERPL-MCNC: 21 MG/DL (ref 8–27)
BUN/CREAT SERPL: 16 (ref 10–24)
CALCIUM SERPL-MCNC: 9.6 MG/DL (ref 8.6–10.2)
CHLORIDE SERPL-SCNC: 99 MMOL/L (ref 96–106)
CHOLEST SERPL-MCNC: 119 MG/DL (ref 100–199)
CO2 SERPL-SCNC: 25 MMOL/L (ref 20–29)
CREAT SERPL-MCNC: 1.35 MG/DL (ref 0.76–1.27)
CREAT UR-MCNC: 176.7 MG/DL
EGFRCR SERPLBLD CKD-EPI 2021: 54 ML/MIN/1.73
GLUCOSE SERPL-MCNC: 136 MG/DL (ref 65–99)
HBA1C MFR BLD: 6.5 % (ref 4.8–5.6)
HDLC SERPL-MCNC: 39 MG/DL
LDLC SERPL CALC-MCNC: 55 MG/DL (ref 0–99)
LDLC/HDLC SERPL: 1.4 RATIO (ref 0–3.6)
MICROALBUMIN UR-MCNC: 3.7 UG/ML
POTASSIUM SERPL-SCNC: 4.5 MMOL/L (ref 3.5–5.2)
SODIUM SERPL-SCNC: 139 MMOL/L (ref 134–144)
TRIGL SERPL-MCNC: 147 MG/DL (ref 0–149)
VLDLC SERPL CALC-MCNC: 25 MG/DL (ref 5–40)

## 2022-03-22 ENCOUNTER — OFFICE VISIT (OUTPATIENT)
Dept: FAMILY MEDICINE CLINIC | Facility: CLINIC | Age: 77
End: 2022-03-22

## 2022-03-22 VITALS
HEART RATE: 75 BPM | HEIGHT: 73 IN | OXYGEN SATURATION: 98 % | RESPIRATION RATE: 18 BRPM | SYSTOLIC BLOOD PRESSURE: 140 MMHG | BODY MASS INDEX: 28.55 KG/M2 | TEMPERATURE: 96.9 F | DIASTOLIC BLOOD PRESSURE: 80 MMHG | WEIGHT: 215.4 LBS

## 2022-03-22 DIAGNOSIS — R22.1 MASS OF RIGHT SIDE OF NECK: ICD-10-CM

## 2022-03-22 DIAGNOSIS — E78.5 HYPERLIPIDEMIA, UNSPECIFIED HYPERLIPIDEMIA TYPE: ICD-10-CM

## 2022-03-22 DIAGNOSIS — E11.9 TYPE 2 DIABETES MELLITUS WITHOUT COMPLICATION, WITHOUT LONG-TERM CURRENT USE OF INSULIN: ICD-10-CM

## 2022-03-22 DIAGNOSIS — I10 PRIMARY HYPERTENSION: Primary | ICD-10-CM

## 2022-03-22 PROCEDURE — G0439 PPPS, SUBSEQ VISIT: HCPCS | Performed by: INTERNAL MEDICINE

## 2022-03-22 PROCEDURE — 99214 OFFICE O/P EST MOD 30 MIN: CPT | Performed by: INTERNAL MEDICINE

## 2022-03-22 PROCEDURE — 1170F FXNL STATUS ASSESSED: CPT | Performed by: INTERNAL MEDICINE

## 2022-03-22 PROCEDURE — 1159F MED LIST DOCD IN RCRD: CPT | Performed by: INTERNAL MEDICINE

## 2022-03-22 RX ORDER — ALPRAZOLAM 1 MG/1
1 TABLET ORAL 2 TIMES DAILY PRN
Qty: 10 TABLET | Refills: 0 | Status: SHIPPED | OUTPATIENT
Start: 2022-03-22 | End: 2022-04-14 | Stop reason: SDUPTHER

## 2022-03-22 NOTE — PROGRESS NOTES
Subjective   Frank J Collesano is a 77 y.o. male. Patient is here today for   Chief Complaint   Patient presents with   • Medicare Wellness-subsequent     PT HERE FOR AWV AND F/U LABS - ALSO HAS A KNOT ON RIGHT SIDE OF NECK HE WANTS LOOKED AT           Vitals:    03/22/22 1317   BP: 140/80   Pulse: 75   Resp: 18   Temp: 96.9 °F (36.1 °C)   SpO2: 98%     Body mass index is 28.8 kg/m².    The following portions of the patient's history were reviewed and updated as appropriate: allergies, current medications, past family history, past medical history, past social history, past surgical history and problem list.    Past Medical History:   Diagnosis Date   • Diabetes mellitus (HCC)    • Hyperlipidemia    • Hypertension       No Known Allergies   Social History     Socioeconomic History   • Marital status: Single   Tobacco Use   • Smoking status: Never Smoker   • Smokeless tobacco: Never Used   Substance and Sexual Activity   • Alcohol use: Yes   • Drug use: Defer   • Sexual activity: Defer        Current Outpatient Medications:   •  lisinopril-hydrochlorothiazide (PRINZIDE,ZESTORETIC) 20-12.5 MG per tablet, TAKE 1 TABLET TWICE A DAY, Disp: 180 tablet, Rfl: 3  •  metFORMIN (GLUCOPHAGE) 500 MG tablet, TAKE 1 TABLET TWICE A DAY WITH MEALS, Disp: 180 tablet, Rfl: 3  •  pravastatin (PRAVACHOL) 20 MG tablet, TAKE 1 TABLET EVERY NIGHT AT BEDTIME, Disp: 90 tablet, Rfl: 3     Objective     History of Present Illness     Review of Systems    Physical Exam    ASSESSMENT     Problems Addressed this Visit        Cardiac and Vasculature    Hyperlipidemia    Hypertension - Primary       Endocrine and Metabolic    Diabetes mellitus (HCC)      Diagnoses       Codes Comments    Primary hypertension    -  Primary ICD-10-CM: I10  ICD-9-CM: 401.9     Hyperlipidemia, unspecified hyperlipidemia type     ICD-10-CM: E78.5  ICD-9-CM: 272.4     Type 2 diabetes mellitus without complication, without long-term current use of insulin (HCC)      ICD-10-CM: E11.9  ICD-9-CM: 250.00           PLAN  There are no Patient Instructions on file for this visit.  No follow-ups on file.

## 2022-03-22 NOTE — PROGRESS NOTES
The ABCs of the Annual Wellness Visit  Subsequent Medicare Wellness Visit    Chief Complaint   Patient presents with   • Medicare Wellness-subsequent     PT HERE FOR AWV AND F/U LABS - ALSO HAS A KNOT ON RIGHT SIDE OF NECK HE WANTS LOOKED AT       Subjective    History of Present Illness:  Frank J Collesano is a 77 y.o. male who presents for a Subsequent Medicare Wellness Visit, blood pressure check, lab follow-up.  He feels well.  He eats healthy and continues to exercise vigorously 6 days a week.  His weight is unchanged in last 6 months.  He monitors his blood pressure and reports stable readings.  Today he also complains of a right neck mass that he first noticed a few weeks ago.  The mass is nontender.  He wonders if it is a sebaceous cyst.  He has never smoked and does not drink alcohol..    The following portions of the patient's history were reviewed and   updated as appropriate: allergies, current medications, past family history, past medical history, past social history, past surgical history and problem list.    Compared to one year ago, the patient feels his physical   health is the same.    Compared to one year ago, the patient feels his mental   health is the same.    Recent Hospitalizations:  He was not admitted to the hospital during the last year.       Current Medical Providers:  Patient Care Team:  Garret Dumont MD as PCP - General    Outpatient Medications Prior to Visit   Medication Sig Dispense Refill   • lisinopril-hydrochlorothiazide (PRINZIDE,ZESTORETIC) 20-12.5 MG per tablet TAKE 1 TABLET TWICE A  tablet 3   • metFORMIN (GLUCOPHAGE) 500 MG tablet TAKE 1 TABLET TWICE A DAY WITH MEALS 180 tablet 3   • pravastatin (PRAVACHOL) 20 MG tablet TAKE 1 TABLET EVERY NIGHT AT BEDTIME 90 tablet 3     No facility-administered medications prior to visit.       No opioid medication identified on active medication list. I have reviewed chart for other potential  high risk medication/s and  "harmful drug interactions in the elderly.          Aspirin is not on active medication list.  Aspirin use is not indicated based on review of current medical condition/s. Risk of harm outweighs potential benefits.  .    Patient Active Problem List   Diagnosis   • Diabetes mellitus (HCC)   • Serum creatinine raised   • Hyperlipidemia   • Hypertension   • Suprapubic abdominal pain   • Lower abdominal pain   • Decreased libido   • Mass of right side of neck     Advance Care Planning  Advance Directive is not on file.  ACP discussion was held with the patient during this visit. Patient has an advance directive (not in EMR), copy requested.          Objective    Vitals:    03/22/22 1317   BP: 140/80   Pulse: 75   Resp: 18   Temp: 96.9 °F (36.1 °C)   TempSrc: Oral   SpO2: 98%   Weight: 97.7 kg (215 lb 6.4 oz)   Height: 184.2 cm (72.52\")     BMI Readings from Last 1 Encounters:   03/22/22 28.80 kg/m²   BMI is above normal parameters. Recommendations include: exercise counseling and nutrition counseling    Does the patient have evidence of cognitive impairment? No    Physical Exam  Vitals reviewed.   Constitutional:       Appearance: Normal appearance.   HENT:      Mouth/Throat:      Comments: Oral exam appears normal  Neck:      Vascular: No carotid bruit.      Comments: There is a palpable mass right neck lateral to the thyroid gland.  There is a palpable lymph node below the mass.  Cardiovascular:      Rate and Rhythm: Normal rate and regular rhythm.      Heart sounds: Normal heart sounds.      Comments: 132/72, 124/70  Pulmonary:      Effort: Pulmonary effort is normal.      Breath sounds: Normal breath sounds.   Musculoskeletal:      Right lower leg: No edema.      Left lower leg: No edema.   Neurological:      Mental Status: He is alert.   Psychiatric:         Mood and Affect: Mood normal.         Behavior: Behavior normal.         Thought Content: Thought content normal.         Judgment: Judgment normal.       Lab " Results   Component Value Date    CHLPL 119 03/17/2022    TRIG 147 03/17/2022    HDL 39 (L) 03/17/2022    LDL 55 03/17/2022    VLDL 25 03/17/2022    HGBA1C 6.5 (H) 03/17/2022            HEALTH RISK ASSESSMENT    Smoking Status:  Social History     Tobacco Use   Smoking Status Never Smoker   Smokeless Tobacco Never Used     Alcohol Consumption:  Social History     Substance and Sexual Activity   Alcohol Use Yes     Fall Risk Screen:    STEADI Fall Risk Assessment was completed, and patient is at HIGH risk for falls. Assessment completed on:3/22/2022    Depression Screening:  PHQ-2/PHQ-9 Depression Screening 3/22/2022   Retired Total Score -   Little Interest or Pleasure in Doing Things 0-->not at all   Feeling Down, Depressed or Hopeless 0-->not at all   PHQ-9: Brief Depression Severity Measure Score 0       Health Habits and Functional and Cognitive Screening:  Functional & Cognitive Status 3/22/2022   Do you have difficulty preparing food and eating? No   Do you have difficulty bathing yourself, getting dressed or grooming yourself? No   Do you have difficulty using the toilet? No   Do you have difficulty moving around from place to place? No   Do you have trouble with steps or getting out of a bed or a chair? No   Current Diet Well Balanced Diet   Dental Exam Up to date   Eye Exam Up to date   Exercise (times per week) 6 times per week   Current Exercises Include Walking;Stationary Bicycling/Spin Class   Current Exercise Activities Include -   Do you need help using the phone?  No   Are you deaf or do you have serious difficulty hearing?  No   Do you need help with transportation? No   Do you need help shopping? No   Do you need help preparing meals?  No   Do you need help with housework?  No   Do you need help with laundry? No   Do you need help taking your medications? No   Do you need help managing money? No   Do you ever drive or ride in a car without wearing a seat belt? No   Have you felt unusual stress,  anger or loneliness in the last month? No   Who do you live with? Other   If you need help, do you have trouble finding someone available to you? No   Have you been bothered in the last four weeks by sexual problems? No   Do you have difficulty concentrating, remembering or making decisions? No       Age-appropriate Screening Schedule:  Refer to the list below for future screening recommendations based on patient's age, sex and/or medical conditions. Orders for these recommended tests are listed in the plan section. The patient has been provided with a written plan.    Health Maintenance   Topic Date Due   • ZOSTER VACCINE (2 of 3) 03/22/2022 (Originally 10/27/2014)   • HEMOGLOBIN A1C  09/17/2022   • DIABETIC EYE EXAM  12/21/2022   • LIPID PANEL  03/17/2023   • URINE MICROALBUMIN  03/17/2023   • TDAP/TD VACCINES (2 - Td or Tdap) 07/07/2024   • INFLUENZA VACCINE  Completed              Assessment/Plan  Blood pressure is well controlled.  Fasting glucose is elevated and hemoglobin A1c is 6.5.  Lipids are normal.  Serum creatinine is 1.35.  Microalbumin creatinine ratio is normal.  We will also check a complete blood count today.  Will order a CT of the neck soft tissues with contrast.  CMS Preventative Services Quick Reference  Risk Factors Identified During Encounter  Cardiovascular Disease  Immunizations Discussed/Encouraged (specific Immunizations; Tdap, Hepatitis A Vaccine/Series, Influenza, Pneumococcal 23, Shingrix and COVID19  The above risks/problems have been discussed with the patient.  Follow up actions/plans if indicated are seen below in the Assessment/Plan Section.  Pertinent information has been shared with the patient in the After Visit Summary.    Diagnoses and all orders for this visit:    1. Primary hypertension (Primary)    2. Hyperlipidemia, unspecified hyperlipidemia type    3. Type 2 diabetes mellitus without complication, without long-term current use of insulin (HCC)    4. Mass of right side  of neck  -     CT soft tissue neck w contrast  -     CBC & Differential    Other orders  -     ALPRAZolam (Xanax) 1 MG tablet; Take 1 tablet by mouth 2 (Two) Times a Day As Needed for Anxiety.  Dispense: 10 tablet; Refill: 0        Follow Up:   No follow-ups on file.     An After Visit Summary and PPPS were made available to the patient.    {Optional Chart Navigation Links

## 2022-03-23 ENCOUNTER — TELEPHONE (OUTPATIENT)
Dept: FAMILY MEDICINE CLINIC | Facility: CLINIC | Age: 77
End: 2022-03-23

## 2022-03-24 ENCOUNTER — HOSPITAL ENCOUNTER (OUTPATIENT)
Dept: CT IMAGING | Facility: HOSPITAL | Age: 77
Discharge: HOME | End: 2022-03-24
Admitting: INTERNAL MEDICINE

## 2022-03-24 LAB
BASOPHILS # BLD AUTO: 0 X10E3/UL (ref 0–0.2)
BASOPHILS NFR BLD AUTO: 0 %
EOSINOPHIL # BLD AUTO: 0.1 X10E3/UL (ref 0–0.4)
EOSINOPHIL NFR BLD AUTO: 1 %
ERYTHROCYTE [DISTWIDTH] IN BLOOD BY AUTOMATED COUNT: 11.8 % (ref 11.6–15.4)
HCT VFR BLD AUTO: 47.2 % (ref 37.5–51)
HGB BLD-MCNC: 15.7 G/DL (ref 13–17.7)
IMM GRANULOCYTES # BLD AUTO: 0 X10E3/UL (ref 0–0.1)
IMM GRANULOCYTES NFR BLD AUTO: 0 %
LYMPHOCYTES # BLD AUTO: 1.5 X10E3/UL (ref 0.7–3.1)
LYMPHOCYTES NFR BLD AUTO: 30 %
MCH RBC QN AUTO: 30.4 PG (ref 26.6–33)
MCHC RBC AUTO-ENTMCNC: 33.3 G/DL (ref 31.5–35.7)
MCV RBC AUTO: 92 FL (ref 79–97)
MONOCYTES # BLD AUTO: 0.7 X10E3/UL (ref 0.1–0.9)
MONOCYTES NFR BLD AUTO: 13 %
NEUTROPHILS # BLD AUTO: 2.7 X10E3/UL (ref 1.4–7)
NEUTROPHILS NFR BLD AUTO: 56 %
PLATELET # BLD AUTO: 385 X10E3/UL (ref 150–450)
RBC # BLD AUTO: 5.16 X10E6/UL (ref 4.14–5.8)
WBC # BLD AUTO: 5 X10E3/UL (ref 3.4–10.8)

## 2022-03-24 PROCEDURE — 70491 CT SOFT TISSUE NECK W/DYE: CPT

## 2022-03-24 PROCEDURE — 25010000002 IOPAMIDOL 61 % SOLUTION: Performed by: INTERNAL MEDICINE

## 2022-03-24 RX ADMIN — IOPAMIDOL 75 ML: 612 INJECTION, SOLUTION INTRAVENOUS at 11:58

## 2022-03-24 NOTE — TELEPHONE ENCOUNTER
Caller: Collesano, Frank J    Relationship: Self    Who are you requesting to speak with (clinical staff, provider,  specific staff member): DR. HOPKINS    What was the call regarding: PATIENT WAS ABLE TO GET IN TOMORROW FOR A CT SCAN AND WANTED TO LET DR. HOPKINS AND PATY KNOW.     HIS APPOINTMENT IS AT 12:00 PM AT THE [facility].   
Understood. I will let Dr. Dumont know. Nothing further needed at this time.      Jovan  
no

## 2022-03-25 ENCOUNTER — TELEPHONE (OUTPATIENT)
Dept: FAMILY MEDICINE CLINIC | Facility: CLINIC | Age: 77
End: 2022-03-25

## 2022-03-25 NOTE — TELEPHONE ENCOUNTER
Discussed results of neck CT scan.  We will set up an ultrasound-guided needle biopsy as suggested by radiology.

## 2022-03-28 ENCOUNTER — TELEPHONE (OUTPATIENT)
Dept: FAMILY MEDICINE CLINIC | Facility: CLINIC | Age: 77
End: 2022-03-28

## 2022-03-28 DIAGNOSIS — R22.1 MASS OF RIGHT SIDE OF NECK: Primary | ICD-10-CM

## 2022-03-28 DIAGNOSIS — R59.0 LYMPHADENOPATHY, ANTERIOR CERVICAL: ICD-10-CM

## 2022-03-28 DIAGNOSIS — E04.1 THYROID NODULE: ICD-10-CM

## 2022-03-28 NOTE — TELEPHONE ENCOUNTER
Caller: Collesano, Frank J    Relationship: Self    Best call back number: [phone]    What was the call regarding: PATIENT WAS CALLING REGARDING THE BIOPSY THAT DR HOPKINS WAS SETTING UP FOR HIM. PLEASE CALL PATIENT TO ADVISE ON STATUS OF THAT.     Do you require a callback: YES

## 2022-03-30 ENCOUNTER — APPOINTMENT (OUTPATIENT)
Dept: CT IMAGING | Facility: HOSPITAL | Age: 77
End: 2022-03-30

## 2022-04-05 ENCOUNTER — HOSPITAL ENCOUNTER (OUTPATIENT)
Dept: ULTRASOUND IMAGING | Facility: HOSPITAL | Age: 77
Discharge: HOME | End: 2022-04-05
Admitting: INTERNAL MEDICINE

## 2022-04-05 VITALS
RESPIRATION RATE: 16 BRPM | HEART RATE: 98 BPM | BODY MASS INDEX: 28.44 KG/M2 | DIASTOLIC BLOOD PRESSURE: 86 MMHG | SYSTOLIC BLOOD PRESSURE: 158 MMHG | HEIGHT: 72 IN | OXYGEN SATURATION: 98 % | TEMPERATURE: 98.4 F | WEIGHT: 210 LBS

## 2022-04-05 DIAGNOSIS — R59.0 LYMPHADENOPATHY, ANTERIOR CERVICAL: ICD-10-CM

## 2022-04-05 DIAGNOSIS — E04.1 THYROID NODULE: ICD-10-CM

## 2022-04-05 PROCEDURE — 76942 ECHO GUIDE FOR BIOPSY: CPT

## 2022-04-05 PROCEDURE — 0 LIDOCAINE 1 % SOLUTION: Performed by: RADIOLOGY

## 2022-04-05 PROCEDURE — A9270 NON-COVERED ITEM OR SERVICE: HCPCS | Performed by: RADIOLOGY

## 2022-04-05 PROCEDURE — 63710000001 ALPRAZOLAM 1 MG TABLET: Performed by: RADIOLOGY

## 2022-04-05 PROCEDURE — 88305 TISSUE EXAM BY PATHOLOGIST: CPT | Performed by: INTERNAL MEDICINE

## 2022-04-05 RX ORDER — ALPRAZOLAM 1 MG/1
1 TABLET ORAL ONCE
Status: COMPLETED | OUTPATIENT
Start: 2022-04-05 | End: 2022-04-05

## 2022-04-05 RX ORDER — LIDOCAINE HYDROCHLORIDE 10 MG/ML
10 INJECTION, SOLUTION INFILTRATION; PERINEURAL ONCE
Status: COMPLETED | OUTPATIENT
Start: 2022-04-05 | End: 2022-04-05

## 2022-04-05 RX ADMIN — LIDOCAINE HYDROCHLORIDE 8 ML: 10 INJECTION, SOLUTION INFILTRATION; PERINEURAL at 14:32

## 2022-04-05 RX ADMIN — ALPRAZOLAM 1 MG: 1 TABLET ORAL at 13:31

## 2022-04-05 NOTE — DISCHARGE INSTRUCTIONS
EDUCATION /DISCHARGE INSTRUCTIONS  CT/US guided biopsy:  A biopsy is a procedure done to remove tissue for further analysis.  Before images are taken to locate the target area.  Images can be obtained using ultrasound, CT or MRI.  A physician will clean your skin with antiseptic soap, place a sterile towel around the site and administer a local anesthetic to numb the area.  The physician will then insert a special needle.  Sometimes images are taken of the needle after it is inserted to ensure the needle is in the correct area to be biopsied.   A sample is obtained and sent to the laboratory for study.  Occasionally the laboratory is unable to make a diagnosis from the sample and the procedure may need to be repeated.  Within a week the radiologist will send a report to your physician.  A pathologist will also examine the tissue and send a report.    Risks of the procedure include but are not limited to:   *  Bleeding    *  Infection   *  Puncture of surrounding organs *  Death     *  Lung collapse if the biopsy is near the chest which may require insertion of a      chest tube to re-inflate the lung if severe.    Benefits of the procedure:  Using x-ray helps to locate the area that requires a biopsy. The procedure is less invasive than a surgical procedure, there are no large incisions and it does not require anesthesia.    Alternatives to the procedure:  A biopsy can be performed surgically.  Risks of a surgical biopsy include exposure to anesthesia, infection, excessive bleeding and injury to abdominal organs.  A benefit of surgical biopsy is the ability to see the area to be biopsied and remove of a larger piece of tissue.    THIS EDUCATION INFORMATION WAS REVIEWED PRIOR TO PROCEDURE AND CONSENT. Patient initials__________________Time___________________    Post Procedure:    *  Expect the biopsy site may be tender up to one week.    *  Rest today (no pushing pulling or straining).   *  Slowly increase activity  tomorrow.    *  If you received sedation do not drive for 24 hours.   *  Keep dressing clean and dry.   *  Leave dressing on puncture site for 24 hours.    *  You may shower when dressing removed.  Call your doctor if experiencing:   *  Signs of infection such as redness, swelling, excessive pain and / or foul        smelling drainage from the puncture site.   *  Chills or fever over 101 degrees (by mouth).   *  Unrelieved pain.   *  Any new or severe symptoms.   *  If experiencing sudden / severe shortness of breath or chest pain go to the       nearest emergency room.   Following the procedure:     Follow-up with the ordering physician as directed.    Continue to take other medications as directed by your physician unless    otherwise instructed.   If applicable, resume taking your blood thinners or Aspirin on ___________.    If you have any concerns please call the Radiology Nurses Desk at [phone].  You are the most important factor in your recovery.  Follow the above instructions carefully.                  EDUCATION / DISCHARGE INSTRUCTIONS  Aspiration:  An aspiration is a procedure used to take a sample of cells or tissue from a lump, mass or other area of concern.   Within a week the radiologist will send a report to your physician.  A pathologist will also examine the tissue and send a report.  THIS EDUCATION INFORMATION WAS REVIEWED PRIOR TO THE PROCEDURE AND CONSENT.  Patient initials_________________Time________________      Post Procedure:    *  Rest today (no pushing pulling or straining).   *  Slowly increase activity tomorow.    *  If you received sedation do not drive for 24 hours.   *  Keep dressing clean and dry.   *  Leave dressing on puncture site for 24 hours.    *  You may shower when dressing removed.   *  If needed, use an ice pack at the site for up to 20 minutes at a time for pain.    Call your doctor if experiencing:   *  Signs of infection such as redness, swelling, excessive pain  and / or foul smelling drainage from the puncture site.   *  Chills or fever over 101 degrees (by mouth).   *  Unrelieved pain.   *  Any new or severe symptoms.      Following the procedure:     Follow-up with the ordering physician as directed.    Continue to take other medications as directed by your physician unless otherwise instructed.     If you have any concerns please call the Radiology Nurses Desk at [phone].  You are the most important factor in your recovery.  Follow the above instructions carefully.

## 2022-04-05 NOTE — NURSING NOTE
Patient arrived in radiology triage for Thyroid biopsy. Wife at bedside.   Protective goggles and mask in place with all patient interactions today

## 2022-04-05 NOTE — NURSING NOTE
Patient D/C 'd home in wheelchair per RN. Protective goggles and mask in place with all patient interactions today.

## 2022-04-05 NOTE — PROGRESS NOTES
04/05/22 1457   Vital Signs   Heart Rate 98   Heart Rate Source Monitor   Resp 16   Resp Rate Source Visual   /86   BP Location Right arm   BP Method Automatic   Patient Position Lying   Oxygen Therapy   SpO2 98 %   Device (Oxygen Therapy) room air   Patient Observation   Observations Patient returned to Xray triage post US BX thyroid and adjacent node. Patient rates pain as a 1.Bandaid dressing x2 clean dry,intact.  Icepack applied to site.Home instructions given with verbalization of understanding. Patient and nurse with appropriate PPE in place.

## 2022-04-06 ENCOUNTER — TELEPHONE (OUTPATIENT)
Dept: INTERVENTIONAL RADIOLOGY/VASCULAR | Facility: HOSPITAL | Age: 77
End: 2022-04-06

## 2022-04-07 LAB
LAB AP CASE REPORT: NORMAL
LAB AP CLINICAL INFORMATION: NORMAL
LAB AP DIAGNOSIS COMMENT: NORMAL
LAB AP INTRADEPARTMENTAL CONSULT: NORMAL
PATH REPORT.FINAL DX SPEC: NORMAL
PATH REPORT.GROSS SPEC: NORMAL

## 2022-04-08 ENCOUNTER — TELEPHONE (OUTPATIENT)
Dept: FAMILY MEDICINE CLINIC | Facility: CLINIC | Age: 77
End: 2022-04-08

## 2022-04-08 DIAGNOSIS — C73 PAPILLARY THYROID CARCINOMA: Primary | ICD-10-CM

## 2022-04-08 NOTE — TELEPHONE ENCOUNTER
Discussed results of thyroid and lymph node biopsy.  Diagnosis is metastatic papillary carcinoma of the thyroid.  Will refer to medical oncology for appropriate evaluation and treatment.

## 2022-04-08 NOTE — TELEPHONE ENCOUNTER
Caller: Collesano, Frank J    Relationship to patient: Self    Best call back number: [phone]    Patient is needing: PT IS CALLING TO DISCUSS HIS THYROID BIOPSY RESULTS WITH MD HOPKINS. PLEASE CALL WHEN POSSIBLE

## 2022-04-11 ENCOUNTER — TELEPHONE (OUTPATIENT)
Dept: FAMILY MEDICINE CLINIC | Facility: CLINIC | Age: 77
End: 2022-04-11

## 2022-04-11 NOTE — TELEPHONE ENCOUNTER
Caller: Viviana Koroma    Relationship: Emergency Contact    Best call back number: [phone]    What form or medical record are you requesting: RECENT LAB WORK, BIOPSY RESULTS, AND SCANS    Who is requesting this form or medical record from you: DR ALIVIA GARCIA WITH [facility]    How would you like to receive the form or medical records (pick-up, mail, fax): FAX  If fax, what is the fax number: [phone]    Additional notes: PATIENT'S WIFE STATES THEY HAVE DECIDED TO GO WITH A DIFFERENT ONCOLOGIST/SURGEON TO TREAT HIS THYROID CANCER. REQUESTS RECORDS BE SENT TO THAT OFFICE TO PROCEED WITH TREATMENT

## 2022-04-12 ENCOUNTER — APPOINTMENT (OUTPATIENT)
Dept: OTHER | Facility: HOSPITAL | Age: 77
End: 2022-04-12

## 2022-04-14 NOTE — TELEPHONE ENCOUNTER
Caller: Viviana Koroma    Relationship: Emergency Contact    Best call back number: [phone]    Requested Prescriptions:   Requested Prescriptions     Pending Prescriptions Disp Refills   • ALPRAZolam (Xanax) 1 MG tablet 10 tablet 0     Sig: Take 1 tablet by mouth 2 (Two) Times a Day As Needed for Anxiety.        Pharmacy where request should be sent: [address] - [phone]  - [phone] FX     Additional details provided by patient: PATIENT IS COMPLETELY OUT OF MEDICATION.    Does the patient have less than a 3 day supply:  [x] Yes  [] No    Katlyn Rodriguez Rep   04/14/22 10:33 EDT

## 2022-04-15 RX ORDER — ALPRAZOLAM 1 MG/1
1 TABLET ORAL 2 TIMES DAILY PRN
Qty: 60 TABLET | Refills: 0 | Status: SHIPPED | OUTPATIENT
Start: 2022-04-15 | End: 2022-06-02

## 2022-05-13 RX ORDER — LEVOTHYROXINE SODIUM 137 UG/1
137 TABLET ORAL DAILY
COMMUNITY
Start: 2022-05-03 | End: 2022-05-13 | Stop reason: SDUPTHER

## 2022-05-13 RX ORDER — LEVOTHYROXINE SODIUM 137 UG/1
137 TABLET ORAL DAILY
Qty: 90 TABLET | Refills: 1 | Status: SHIPPED | OUTPATIENT
Start: 2022-05-13 | End: 2022-11-14

## 2022-05-13 NOTE — TELEPHONE ENCOUNTER
Caller: Collesano, Frank J    Relationship: Self    Best call back number: [phone]    What was the call regarding: PATIENT STATED THAT HE HAD AN OPERATION AND THE HOSPITAL PRESCRIBED LEVOTHYROXINE 137 MG TAKE ONE TABLET BY MOUTH DAILY. PATIENT STATED THAT HE HAS CONTACTED [employer] TO GET THAT PRESCRIPTION TRANSFERRED OVER TO THEM TO FILL. PATIENT STATED THAT [employer] WILL BE SENDING A FAX TO DR HOPKINS TO GET APPROVAL TO FILL THE PRESCRIPTION.      PATIENT ALSO STATED THAT HE WOULD LIKE DR HOPKINS TO KNOW THAT HE IS DOING FINE AFTER THE OPERATION AND PATIENT LOOKS FORWARD TO SEEING DR HOPKINS DOWN THE ROAD.     Do you require a callback: YES

## 2022-05-26 RX ORDER — LISINOPRIL AND HYDROCHLOROTHIAZIDE 20; 12.5 MG/1; MG/1
TABLET ORAL
Qty: 180 TABLET | Refills: 3 | Status: SHIPPED | OUTPATIENT
Start: 2022-05-26

## 2022-06-02 ENCOUNTER — OFFICE VISIT (OUTPATIENT)
Dept: FAMILY MEDICINE CLINIC | Facility: CLINIC | Age: 77
End: 2022-06-02

## 2022-06-02 VITALS
RESPIRATION RATE: 8 BRPM | BODY MASS INDEX: 28.71 KG/M2 | DIASTOLIC BLOOD PRESSURE: 89 MMHG | OXYGEN SATURATION: 97 % | SYSTOLIC BLOOD PRESSURE: 145 MMHG | HEIGHT: 72 IN | WEIGHT: 212 LBS | HEART RATE: 74 BPM | TEMPERATURE: 96.9 F

## 2022-06-02 DIAGNOSIS — I10 PRIMARY HYPERTENSION: ICD-10-CM

## 2022-06-02 DIAGNOSIS — C73 PRIMARY THYROID PAPILLARY CARCINOMA: Primary | ICD-10-CM

## 2022-06-02 PROCEDURE — 99213 OFFICE O/P EST LOW 20 MIN: CPT | Performed by: INTERNAL MEDICINE

## 2022-06-02 NOTE — PROGRESS NOTES
Subjective   Frank J Collesano is a 77 y.o. male. Patient is here today for   Chief Complaint   Patient presents with   • Hospital Follow Up Visit     POST THYROID REMOVAL       (Not on file)-  Risk for Readmission (LACE) No data recorded         Vitals:    06/02/22 1109   BP: 145/89   Pulse: 74   Resp: 8   Temp: 96.9 °F (36.1 °C)   SpO2: 97%     The following portions of the patient's history were reviewed and updated as appropriate: allergies, current medications, past family history, past medical history, past social history, past surgical history and problem list.    Past Medical History:   Diagnosis Date   • Diabetes mellitus (HCC)    • Hyperlipidemia    • Hypertension       No Known Allergies   Social History     Socioeconomic History   • Marital status: Single   Tobacco Use   • Smoking status: Never Smoker   • Smokeless tobacco: Never Used   Substance and Sexual Activity   • Alcohol use: Yes   • Drug use: Defer   • Sexual activity: Defer        Current Outpatient Medications:   •  levothyroxine (SYNTHROID, LEVOTHROID) 137 MCG tablet, Take 1 tablet by mouth Daily for 30 days., Disp: 90 tablet, Rfl: 1  •  lisinopril-hydrochlorothiazide (PRINZIDE,ZESTORETIC) 20-12.5 MG per tablet, TAKE 1 TABLET TWICE A DAY, Disp: 180 tablet, Rfl: 3  •  metFORMIN (GLUCOPHAGE) 500 MG tablet, TAKE 1 TABLET TWICE A DAY WITH MEALS, Disp: 180 tablet, Rfl: 3  •  pravastatin (PRAVACHOL) 20 MG tablet, TAKE 1 TABLET EVERY NIGHT AT BEDTIME, Disp: 90 tablet, Rfl: 3     Objective     History of Present Illness Be is here for hospital follow-up.  He was diagnosed with papillary carcinoma of the thyroid and underwent total thyroidectomy 1 month ago by Dr. Sexton at [facility].  He did well and feels fine now.  He has seen radiation oncology and also has an appointment with endocrinology.  He currently is on Synthroid 137 mcg.  He has been monitoring his blood pressure reports normal readings.  He did lose a few pounds around surgery.   He did experience some right upper chest tenderness after surgery which is improving.  He previously had a diagnosis of well-controlled hypertension, hyperlipidemia, and type 2 diabetes mellitus.    Review of Systems   Constitutional: Negative for activity change and unexpected weight change.   Respiratory: Negative.    Cardiovascular: Negative.    Gastrointestinal: Negative.    Genitourinary: Negative.    Neurological: Negative.    Psychiatric/Behavioral: Negative.        Physical Exam  Vitals reviewed.   Constitutional:       Appearance: Normal appearance.   Cardiovascular:      Rate and Rhythm: Normal rate and regular rhythm.      Heart sounds: Normal heart sounds.      Comments: 120/80  Pulmonary:      Effort: Pulmonary effort is normal.      Breath sounds: Normal breath sounds.   Neurological:      Mental Status: He is alert.   Psychiatric:         Mood and Affect: Mood normal.         Behavior: Behavior normal.         Thought Content: Thought content normal.         Judgment: Judgment normal.         ASSESSMENT reviewed and discussed hospital records.  Wound looks excellent.  Blood pressure readings are normal.  Follow-up with me in September as scheduled.     Problems Addressed this Visit        Cardiac and Vasculature    Hypertension       Hematology and Neoplasia    Primary thyroid papillary carcinoma (HCC) - Primary      Diagnoses       Codes Comments    Primary thyroid papillary carcinoma (HCC)    -  Primary ICD-10-CM: C73  ICD-9-CM: 193     Primary hypertension     ICD-10-CM: I10  ICD-9-CM: 401.9           Current outpatient and discharge medications have been reconciled for the patient.  Reviewed by: Garret Dumont MD      PLAN    There are no Patient Instructions on file for this visit.  Return for Next scheduled follow up.

## 2022-08-26 RX ORDER — PRAVASTATIN SODIUM 20 MG
TABLET ORAL
Qty: 90 TABLET | Refills: 3 | Status: SHIPPED | OUTPATIENT
Start: 2022-08-26

## 2022-09-13 DIAGNOSIS — C73 PRIMARY THYROID PAPILLARY CARCINOMA: Primary | ICD-10-CM

## 2022-09-13 DIAGNOSIS — E78.5 HYPERLIPIDEMIA, UNSPECIFIED HYPERLIPIDEMIA TYPE: ICD-10-CM

## 2022-09-13 DIAGNOSIS — I10 PRIMARY HYPERTENSION: ICD-10-CM

## 2022-09-13 DIAGNOSIS — E11.9 TYPE 2 DIABETES MELLITUS WITHOUT COMPLICATION, WITHOUT LONG-TERM CURRENT USE OF INSULIN: ICD-10-CM

## 2022-09-20 LAB
ALBUMIN SERPL-MCNC: 4.3 G/DL (ref 3.5–5.2)
ALBUMIN/GLOB SERPL: 2.2 G/DL
ALP SERPL-CCNC: 44 U/L (ref 39–117)
ALT SERPL-CCNC: 18 U/L (ref 1–41)
AST SERPL-CCNC: 20 U/L (ref 1–40)
BASOPHILS # BLD AUTO: 0.02 10*3/MM3 (ref 0–0.2)
BASOPHILS NFR BLD AUTO: 0.7 % (ref 0–1.5)
BILIRUB SERPL-MCNC: 0.5 MG/DL (ref 0–1.2)
BUN SERPL-MCNC: 19 MG/DL (ref 8–23)
BUN/CREAT SERPL: 16.7 (ref 7–25)
CALCIUM SERPL-MCNC: 9.6 MG/DL (ref 8.6–10.5)
CHLORIDE SERPL-SCNC: 99 MMOL/L (ref 98–107)
CHOLEST SERPL-MCNC: 129 MG/DL (ref 0–200)
CO2 SERPL-SCNC: 30 MMOL/L (ref 22–29)
CREAT SERPL-MCNC: 1.14 MG/DL (ref 0.76–1.27)
EGFRCR SERPLBLD CKD-EPI 2021: 66.2 ML/MIN/1.73
EOSINOPHIL # BLD AUTO: 0.03 10*3/MM3 (ref 0–0.4)
EOSINOPHIL NFR BLD AUTO: 1 % (ref 0.3–6.2)
ERYTHROCYTE [DISTWIDTH] IN BLOOD BY AUTOMATED COUNT: 13 % (ref 12.3–15.4)
GLOBULIN SER CALC-MCNC: 2 GM/DL
GLUCOSE SERPL-MCNC: 109 MG/DL (ref 65–99)
HBA1C MFR BLD: 6.7 % (ref 4.8–5.6)
HCT VFR BLD AUTO: 40.5 % (ref 37.5–51)
HDLC SERPL-MCNC: 49 MG/DL (ref 40–60)
HGB BLD-MCNC: 14 G/DL (ref 13–17.7)
IMM GRANULOCYTES # BLD AUTO: 0.01 10*3/MM3 (ref 0–0.05)
IMM GRANULOCYTES NFR BLD AUTO: 0.3 % (ref 0–0.5)
LDLC SERPL CALC-MCNC: 64 MG/DL (ref 0–100)
LDLC/HDLC SERPL: 1.3 {RATIO}
LYMPHOCYTES # BLD AUTO: 0.88 10*3/MM3 (ref 0.7–3.1)
LYMPHOCYTES NFR BLD AUTO: 28.7 % (ref 19.6–45.3)
MCH RBC QN AUTO: 31.7 PG (ref 26.6–33)
MCHC RBC AUTO-ENTMCNC: 34.6 G/DL (ref 31.5–35.7)
MCV RBC AUTO: 91.8 FL (ref 79–97)
MONOCYTES # BLD AUTO: 0.48 10*3/MM3 (ref 0.1–0.9)
MONOCYTES NFR BLD AUTO: 15.6 % (ref 5–12)
NEUTROPHILS # BLD AUTO: 1.65 10*3/MM3 (ref 1.7–7)
NEUTROPHILS NFR BLD AUTO: 53.7 % (ref 42.7–76)
NRBC BLD AUTO-RTO: 0 /100 WBC (ref 0–0.2)
PLATELET # BLD AUTO: 297 10*3/MM3 (ref 140–450)
POTASSIUM SERPL-SCNC: 4.4 MMOL/L (ref 3.5–5.2)
PROT SERPL-MCNC: 6.3 G/DL (ref 6–8.5)
RBC # BLD AUTO: 4.41 10*6/MM3 (ref 4.14–5.8)
SODIUM SERPL-SCNC: 137 MMOL/L (ref 136–145)
TRIGL SERPL-MCNC: 82 MG/DL (ref 0–150)
VLDLC SERPL CALC-MCNC: 16 MG/DL (ref 5–40)
WBC # BLD AUTO: 3.07 10*3/MM3 (ref 3.4–10.8)

## 2022-09-22 ENCOUNTER — OFFICE VISIT (OUTPATIENT)
Dept: FAMILY MEDICINE CLINIC | Facility: CLINIC | Age: 77
End: 2022-09-22

## 2022-09-22 VITALS
TEMPERATURE: 98 F | WEIGHT: 211.8 LBS | HEART RATE: 63 BPM | OXYGEN SATURATION: 99 % | SYSTOLIC BLOOD PRESSURE: 144 MMHG | BODY MASS INDEX: 28.69 KG/M2 | DIASTOLIC BLOOD PRESSURE: 76 MMHG | RESPIRATION RATE: 18 BRPM | HEIGHT: 72 IN

## 2022-09-22 DIAGNOSIS — Z12.5 PROSTATE CANCER SCREENING: ICD-10-CM

## 2022-09-22 DIAGNOSIS — E78.5 HYPERLIPIDEMIA, UNSPECIFIED HYPERLIPIDEMIA TYPE: Primary | ICD-10-CM

## 2022-09-22 DIAGNOSIS — E11.9 TYPE 2 DIABETES MELLITUS WITHOUT COMPLICATION, WITHOUT LONG-TERM CURRENT USE OF INSULIN: ICD-10-CM

## 2022-09-22 DIAGNOSIS — I10 PRIMARY HYPERTENSION: ICD-10-CM

## 2022-09-22 PROCEDURE — 99214 OFFICE O/P EST MOD 30 MIN: CPT | Performed by: INTERNAL MEDICINE

## 2022-09-22 RX ORDER — SILDENAFIL CITRATE 20 MG/1
TABLET ORAL
Qty: 60 TABLET | Refills: 2 | Status: SHIPPED | OUTPATIENT
Start: 2022-09-22 | End: 2023-03-28

## 2022-09-22 NOTE — PROGRESS NOTES
Subjective   Frank J Collesano is a 77 y.o. male. Patient is here today for   Chief Complaint   Patient presents with   • Hypertension     HYPERLIPIDEMIA, DIABETES- F/U LABS          Vitals:    09/22/22 1250   BP: 144/76   Pulse: 63   Resp: 18   Temp: 98 °F (36.7 °C)   SpO2: 99%     Body mass index is 28.72 kg/m².      The following portions of the patient's history were reviewed and updated as appropriate: allergies, current medications, past family history, past medical history, past social history, past surgical history and problem list.    Past Medical History:   Diagnosis Date   • Cancer (HCC) Thyroid    Yes   • Cataract 8/2015   • Diabetes mellitus (HCC)    • Hyperlipidemia    • Hypertension       No Known Allergies   Social History     Socioeconomic History   • Marital status: Single   Tobacco Use   • Smoking status: Never Smoker   • Smokeless tobacco: Never Used   Vaping Use   • Vaping Use: Never used   Substance and Sexual Activity   • Alcohol use: Yes   • Drug use: No   • Sexual activity: Yes     Partners: Female        Current Outpatient Medications:   •  lisinopril-hydrochlorothiazide (PRINZIDE,ZESTORETIC) 20-12.5 MG per tablet, TAKE 1 TABLET TWICE A DAY, Disp: 180 tablet, Rfl: 3  •  metFORMIN (GLUCOPHAGE) 500 MG tablet, TAKE 1 TABLET TWICE A DAY WITH MEALS, Disp: 180 tablet, Rfl: 3  •  pravastatin (PRAVACHOL) 20 MG tablet, TAKE 1 TABLET EVERY NIGHT AT BEDTIME, Disp: 90 tablet, Rfl: 3  •  levothyroxine (SYNTHROID, LEVOTHROID) 137 MCG tablet, Take 1 tablet by mouth Daily for 30 days., Disp: 90 tablet, Rfl: 1  •  sildenafil (REVATIO) 20 MG tablet, Take 1-5 po daily as needed for ED, Disp: 60 tablet, Rfl: 2     Objective   History of Present Illness Be is here for a blood pressure check and lab follow-up.  He has hypertension, hyperlipidemia, type 2 diabetes mellitus, surgical hypothyroidism, papillary carcinoma of the thyroid.  He feels well.  He continues to eat healthy and exercises vigorously 6 days  a week.  His weight is down 4 pounds from 4 months ago.  He monitors his blood pressure and reports normal readings.  He states that he did not take his blood pressure medicine today.  He had surgery in May and is followed by radiation oncology.  He also is seeing endocrinology.  He developed a cough about 5 weeks ago which is getting better.  He tested negative for COVID twice.  He has no allergic rhinitis symptoms.  He is on lisinopril but never had a cough before.    Review of Systems   Constitutional:        Weight loss 4 lbs   Respiratory: Positive for cough. Negative for shortness of breath.    Cardiovascular: Negative.    Gastrointestinal: Negative.    Genitourinary: Negative.    Neurological: Negative.    Psychiatric/Behavioral: Negative.        Physical Exam  Vitals reviewed.   Constitutional:       Appearance: Normal appearance.   Cardiovascular:      Rate and Rhythm: Normal rate and regular rhythm.      Heart sounds: Normal heart sounds.      Comments: 144/74  Neurological:      Mental Status: He is alert.   Psychiatric:         Mood and Affect: Mood normal.         Behavior: Behavior normal.         Thought Content: Thought content normal.         Judgment: Judgment normal.         ASSESSMENT blood pressure readings are high today and likely because you did not take lisinopril hydrochlorothiazide today.  Your readings at home are normal.  Lipids are normal.  Fasting glucose is mildly elevated and hemoglobin A1c is stable at 6.7.  Kidney functions are normal.  Continue diet, exercise, and current medicines.     Problems Addressed this Visit        Cardiac and Vasculature    Hyperlipidemia - Primary    Hypertension       Endocrine and Metabolic    Diabetes mellitus (HCC)      Diagnoses       Codes Comments    Hyperlipidemia, unspecified hyperlipidemia type    -  Primary ICD-10-CM: E78.5  ICD-9-CM: 272.4     Primary hypertension     ICD-10-CM: I10  ICD-9-CM: 401.9     Type 2 diabetes mellitus without  complication, without long-term current use of insulin (HCC)     ICD-10-CM: E11.9  ICD-9-CM: 250.00           PLAN  There are no Patient Instructions on file for this visit.    Return in about 6 months (around 3/22/2023), or Wellness visit, for labs.  Answers for HPI/ROS submitted by the patient on 9/15/2022  What is the primary reason for your visit?: Physical

## 2022-11-14 RX ORDER — LEVOTHYROXINE SODIUM 137 UG/1
TABLET ORAL
Qty: 90 TABLET | Refills: 3 | Status: SHIPPED | OUTPATIENT
Start: 2022-11-14

## 2022-11-14 NOTE — TELEPHONE ENCOUNTER
PATIENT IS WANTING TO CHECK STATUS. PLEASE ADVISE. DR. HOPKINS DOES NOT NORMALLY APPROVE THIS FOR HIM.

## 2022-11-21 ENCOUNTER — TELEPHONE (OUTPATIENT)
Dept: FAMILY MEDICINE CLINIC | Facility: CLINIC | Age: 77
End: 2022-11-21

## 2022-11-21 NOTE — TELEPHONE ENCOUNTER
PATIENT STATES THAT HE DOESN'T NEED A CALLBACK RIGHT NOW BUT HE WANTS IT NOTED FOR FUTURE REFERENCE THAT HE WANTS ALL MEDICATIONS THAT ARE REGULAR MEDICATIONS GO TO [facility]. ONLY USES [facility] FOR ONE TIME MEDICATIONS.

## 2023-02-15 ENCOUNTER — TELEPHONE (OUTPATIENT)
Dept: FAMILY MEDICINE CLINIC | Facility: CLINIC | Age: 78
End: 2023-02-15

## 2023-02-15 NOTE — TELEPHONE ENCOUNTER
PATIENT'S WIFE IS CALLING BACK STATING THAT THIS IS THE 3RD TIME HE HAS BEEN BITTEN BY AN ANT, AND THIS TIME, HE HAD 3 ANY BITES WHICH CAUSES HIM TO CRASH.    PATIENT NOW STATES THAT INSTEAD OF TALKING WITH DR. HOPKINS, THAT THE PATIENT WOULD LIKE A REFERRAL TO AN ALLERGIST.    THEY STATES THAT THIS HAS HAPPENED 3 TIMES TO THE PATIENT, AND ALL 3 TIMES, THE PATIENT WAS BITTEN BY AN ANT.  PLEASE MAKE A REFERRAL TO AN ALLERGIST, AND CONTACT THE PATIENT ABOUT SCHEDULING AN APPOINTMENT WHEN HE RETURNS TO [place].   CALL  TO SPEAK WITH THE PATIENT ABOUT THE REFERRAL.

## 2023-02-15 NOTE — TELEPHONE ENCOUNTER
"  Caller: Viviana Koroma    Relationship: Emergency Contact    Best call back number: [phone]    What is the best time to reach you: ANY      Who are you requesting to speak with (clinical staff, provider,  specific staff member): KELLIE     What was the call regarding: PATIENTS WIFE STATES THAT HE HAD ANOTHER \"EPISODE\" WHERE HE CRASHED AND THEY HAD TO DO CPR AND ETC. HE WAS TAKEN TO THE ER AND THE ONLY THING THEY FOUND WAS HE WAS LOW ON MAGNESIUM AND POTASSIUM PLEASE ADVISE. PATIENT STILL IN [place] UNTIL END OF FEB.     Do you require a callback: YES   "

## 2023-03-09 NOTE — TELEPHONE ENCOUNTER
Dr. Dumont was out on vacation. Looked to see when he had an appointment with Dr. Dumont and I seen that another  Had already placed a referral for specialist to see about the affects of ant bites if they are the cause of him crashing.

## 2023-03-14 ENCOUNTER — PRIOR AUTHORIZATION (OUTPATIENT)
Dept: ONCOLOGY | Facility: CLINIC | Age: 78
End: 2023-03-14
Payer: MEDICARE

## 2023-03-14 ENCOUNTER — CONSULT (OUTPATIENT)
Dept: ONCOLOGY | Facility: CLINIC | Age: 78
End: 2023-03-14
Payer: MEDICARE

## 2023-03-14 ENCOUNTER — LAB (OUTPATIENT)
Dept: OTHER | Facility: HOSPITAL | Age: 78
End: 2023-03-14
Payer: MEDICARE

## 2023-03-14 VITALS
OXYGEN SATURATION: 98 % | DIASTOLIC BLOOD PRESSURE: 79 MMHG | HEIGHT: 72 IN | BODY MASS INDEX: 29.28 KG/M2 | RESPIRATION RATE: 18 BRPM | HEART RATE: 77 BPM | TEMPERATURE: 98 F | WEIGHT: 216.2 LBS | SYSTOLIC BLOOD PRESSURE: 158 MMHG

## 2023-03-14 DIAGNOSIS — C73 PRIMARY THYROID PAPILLARY CARCINOMA: ICD-10-CM

## 2023-03-14 DIAGNOSIS — R74.8 ELEVATED SERUM TRYPTASE: Primary | ICD-10-CM

## 2023-03-14 DIAGNOSIS — R22.1 MASS OF RIGHT SIDE OF NECK: ICD-10-CM

## 2023-03-14 DIAGNOSIS — T63.481A ANAPHYLAXIS DUE TO INSECT VENOM: Primary | ICD-10-CM

## 2023-03-14 DIAGNOSIS — R74.8 ELEVATED SERUM TRYPTASE: ICD-10-CM

## 2023-03-14 PROBLEM — T78.2XXA ANAPHYLAXIS DUE TO INSECT VENOM: Status: ACTIVE | Noted: 2023-03-14

## 2023-03-14 LAB
ALBUMIN SERPL-MCNC: 4.4 G/DL (ref 3.5–5.2)
ALBUMIN/GLOB SERPL: 1.4 G/DL
ALP SERPL-CCNC: 59 U/L (ref 39–117)
ALT SERPL W P-5'-P-CCNC: 22 U/L (ref 1–41)
ANION GAP SERPL CALCULATED.3IONS-SCNC: 10.7 MMOL/L (ref 5–15)
AST SERPL-CCNC: 21 U/L (ref 1–40)
BASOPHILS # BLD AUTO: 0.01 10*3/MM3 (ref 0–0.2)
BASOPHILS NFR BLD AUTO: 0.2 % (ref 0–1.5)
BILIRUB SERPL-MCNC: 0.6 MG/DL (ref 0–1.2)
BUN SERPL-MCNC: 16 MG/DL (ref 8–23)
BUN/CREAT SERPL: 12.6 (ref 7–25)
CALCIUM SPEC-SCNC: 9.5 MG/DL (ref 8.6–10.5)
CHLORIDE SERPL-SCNC: 99 MMOL/L (ref 98–107)
CO2 SERPL-SCNC: 28.3 MMOL/L (ref 22–29)
CREAT SERPL-MCNC: 1.27 MG/DL (ref 0.76–1.27)
DEPRECATED RDW RBC AUTO: 40.9 FL (ref 37–54)
EGFRCR SERPLBLD CKD-EPI 2021: 57.8 ML/MIN/1.73
EOSINOPHIL # BLD AUTO: 0.09 10*3/MM3 (ref 0–0.4)
EOSINOPHIL NFR BLD AUTO: 1.8 % (ref 0.3–6.2)
ERYTHROCYTE [DISTWIDTH] IN BLOOD BY AUTOMATED COUNT: 12.2 % (ref 12.3–15.4)
GLOBULIN UR ELPH-MCNC: 3.2 GM/DL
GLUCOSE SERPL-MCNC: 173 MG/DL (ref 65–99)
HCT VFR BLD AUTO: 43.9 % (ref 37.5–51)
HGB BLD-MCNC: 14.8 G/DL (ref 13–17.7)
IMM GRANULOCYTES # BLD AUTO: 0.01 10*3/MM3 (ref 0–0.05)
IMM GRANULOCYTES NFR BLD AUTO: 0.2 % (ref 0–0.5)
LDH SERPL-CCNC: 142 U/L (ref 135–225)
LYMPHOCYTES # BLD AUTO: 1.25 10*3/MM3 (ref 0.7–3.1)
LYMPHOCYTES NFR BLD AUTO: 24.9 % (ref 19.6–45.3)
MCH RBC QN AUTO: 30.9 PG (ref 26.6–33)
MCHC RBC AUTO-ENTMCNC: 33.7 G/DL (ref 31.5–35.7)
MCV RBC AUTO: 91.6 FL (ref 79–97)
MONOCYTES # BLD AUTO: 0.52 10*3/MM3 (ref 0.1–0.9)
MONOCYTES NFR BLD AUTO: 10.3 % (ref 5–12)
NEUTROPHILS NFR BLD AUTO: 3.15 10*3/MM3 (ref 1.7–7)
NEUTROPHILS NFR BLD AUTO: 62.6 % (ref 42.7–76)
NRBC BLD AUTO-RTO: 0 /100 WBC (ref 0–0.2)
PLATELET # BLD AUTO: 294 10*3/MM3 (ref 140–450)
PMV BLD AUTO: 9 FL (ref 6–12)
POTASSIUM SERPL-SCNC: 4 MMOL/L (ref 3.5–5.2)
PROT SERPL-MCNC: 7.6 G/DL (ref 6–8.5)
RBC # BLD AUTO: 4.79 10*6/MM3 (ref 4.14–5.8)
SODIUM SERPL-SCNC: 138 MMOL/L (ref 136–145)
WBC NRBC COR # BLD: 5.03 10*3/MM3 (ref 3.4–10.8)

## 2023-03-14 PROCEDURE — 80053 COMPREHEN METABOLIC PANEL: CPT | Performed by: INTERNAL MEDICINE

## 2023-03-14 PROCEDURE — 36415 COLL VENOUS BLD VENIPUNCTURE: CPT

## 2023-03-14 PROCEDURE — 83520 IMMUNOASSAY QUANT NOS NONAB: CPT | Performed by: INTERNAL MEDICINE

## 2023-03-14 PROCEDURE — 85025 COMPLETE CBC W/AUTO DIFF WBC: CPT | Performed by: INTERNAL MEDICINE

## 2023-03-14 PROCEDURE — 83615 LACTATE (LD) (LDH) ENZYME: CPT | Performed by: INTERNAL MEDICINE

## 2023-03-14 RX ORDER — EPINEPHRINE 0.3 MG/.3ML
INJECTION SUBCUTANEOUS
COMMUNITY
Start: 2023-03-01

## 2023-03-14 NOTE — PROGRESS NOTES
Subjective     REASON FOR CONSULTATION: Elevated tryptase level in a gentleman with anaphylactic reaction to fire ant bites.  Evaluate for systemic mastocytosis.  Provide an opinion on any further workup or treatment                             REQUESTING PHYSICIAN: Sandor Rae MD    RECORDS OBTAINED:  Records of the patients history including those obtained from the referring provider were reviewed and summarized in detail.    HISTORY OF PRESENT ILLNESS:  The patient is a 78 y.o. year old male who is here for an opinion about the above issue.  He appears remarkably healthy for his age but has an interesting history of having severe reactions to ant bites and most recently he developed a severe anaphylactic reaction to multiple bites from fire ants in [place].    He has been evaluated by Dr. 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  As part of his work-up he also was found to have an elevated tryptase level and for this reason he is referred to us for evaluation of possible systemic mastocytosis.    His blood count today is normal in the office.  He is not having any lymphadenopathy, fevers, chills, night sweats, or unexplained weight loss.  He does not have any unusual recurring rashes or pruritus.    He is agreeable to have some additional labs performed in the office today but when we discussed proceeding with a bone marrow examination he has steadfastly refused.  I did not argue too much as our suspicion for severe mastocytosis is relatively low.    He has a history of thyroid carcinoma which was treated with complete thyroidectomy followed by radioactive iodine treatment.  He appears to be in remission based on his nuclear medicine scan from 8/4/2022.    History of Present Illness     Past Medical History:   Diagnosis Date   • Cancer (HCC) Thyroid    Yes   • Cataract 8/2015   • Diabetes mellitus (HCC)    • Hyperlipidemia    • Hypertension         Past Surgical History:    Procedure Laterality Date   • APPENDECTOMY     • COLONOSCOPY     • EYE SURGERY     • LYMPH NODE BIOPSY  4/2022        Current Outpatient Medications on File Prior to Visit   Medication Sig Dispense Refill   • EPINEPHrine (EPIPEN) 0.3 MG/0.3ML solution auto-injector injection      • levothyroxine (SYNTHROID, LEVOTHROID) 137 MCG tablet TAKE 1 TABLET DAILY 90 tablet 3   • lisinopril-hydrochlorothiazide (PRINZIDE,ZESTORETIC) 20-12.5 MG per tablet TAKE 1 TABLET TWICE A  tablet 3   • metFORMIN (GLUCOPHAGE) 500 MG tablet TAKE 1 TABLET TWICE A DAY WITH MEALS 180 tablet 3   • pravastatin (PRAVACHOL) 20 MG tablet TAKE 1 TABLET EVERY NIGHT AT BEDTIME 90 tablet 3   • sildenafil (REVATIO) 20 MG tablet Take 1-5 po daily as needed for ED 60 tablet 2     No current facility-administered medications on file prior to visit.        ALLERGIES:  No Known Allergies     Social History     Socioeconomic History   • Marital status: Single   Tobacco Use   • Smoking status: Never   • Smokeless tobacco: Never   Vaping Use   • Vaping Use: Never used   Substance and Sexual Activity   • Alcohol use: Yes   • Drug use: No   • Sexual activity: Yes     Partners: Female        Family History   Problem Relation Age of Onset   • Aneurysm Father    • Early death Father         Stroke   • Stroke Father    • Diabetes Sister    • Early death Mother         Review of Systems   Constitutional: Negative for activity change, chills, fatigue and fever.   HENT: Negative for mouth sores, trouble swallowing and voice change.    Eyes: Negative for pain and visual disturbance.   Respiratory: Negative for cough, shortness of breath and wheezing.    Cardiovascular: Negative for chest pain and palpitations.   Gastrointestinal: Negative for abdominal pain, constipation, diarrhea, nausea and vomiting.   Genitourinary: Negative for difficulty urinating, frequency and urgency.   Musculoskeletal: Negative for arthralgias and joint swelling.   Skin: Negative for  "rash.   Neurological: Negative for dizziness, seizures, weakness and headaches.   Hematological: Negative for adenopathy. Does not bruise/bleed easily.   Psychiatric/Behavioral: Negative for behavioral problems and confusion. The patient is not nervous/anxious.         Objective     Vitals:    03/14/23 0855   BP: 158/79   Pulse: 77   Resp: 18   Temp: 98 °F (36.7 °C)   TempSrc: Temporal   SpO2: 98%   Weight: 98.1 kg (216 lb 3.2 oz)   Height: 182.9 cm (72.01\")   PainSc: 0-No pain     Current Status 3/14/2023   ECOG score 0       Physical Exam  Constitutional:       General: He is not in acute distress.     Appearance: He is well-developed.   HENT:      Head: Normocephalic.   Eyes:      General: No scleral icterus.     Conjunctiva/sclera: Conjunctivae normal.      Pupils: Pupils are equal, round, and reactive to light.   Neck:      Thyroid: No thyromegaly.      Vascular: No JVD.      Comments: Surgical scar from thyroidectomy  Cardiovascular:      Rate and Rhythm: Normal rate and regular rhythm.      Heart sounds: No murmur heard.    No friction rub. No gallop.   Pulmonary:      Effort: Pulmonary effort is normal.      Breath sounds: Normal breath sounds. No wheezing or rales.   Abdominal:      General: There is no distension.      Palpations: Abdomen is soft. There is no mass.      Tenderness: There is no abdominal tenderness.   Musculoskeletal:         General: No deformity. Normal range of motion.      Cervical back: Normal range of motion and neck supple.   Lymphadenopathy:      Cervical: No cervical adenopathy.   Skin:     General: Skin is warm and dry.      Findings: No erythema or rash.   Neurological:      Mental Status: He is alert and oriented to person, place, and time.      Cranial Nerves: No cranial nerve deficit.      Deep Tendon Reflexes: Reflexes are normal and symmetric.   Psychiatric:         Behavior: Behavior normal.         Judgment: Judgment normal.           RECENT LABS:  Hematology WBC   Date " Value Ref Range Status   03/14/2023 5.03 3.40 - 10.80 10*3/mm3 Final   09/19/2022 3.07 (L) 3.40 - 10.80 10*3/mm3 Final   04/27/2022 4.37 (L) 4.5 - 11.0 10*3/uL Final     RBC   Date Value Ref Range Status   03/14/2023 4.79 4.14 - 5.80 10*6/mm3 Final   09/19/2022 4.41 4.14 - 5.80 10*6/mm3 Final   04/27/2022 4.96 4.5 - 5.9 10*6/uL Final     Hemoglobin   Date Value Ref Range Status   03/14/2023 14.8 13.0 - 17.7 g/dL Final   04/27/2022 14.9 13.5 - 17.5 g/dL Final     Hematocrit   Date Value Ref Range Status   03/14/2023 43.9 37.5 - 51.0 % Final   04/27/2022 45.4 41.0 - 53.0 % Final     Platelets   Date Value Ref Range Status   03/14/2023 294 140 - 450 10*3/mm3 Final   04/27/2022 366 140 - 440 10*3/uL Final      Tryptase level 20.6 ug/L  3/1/2023    Assessment & Plan     1.  Elevated tryptase level in a gentleman with a history of anaphylaxis following fire ant bites.  He does appear to be allergic to fire ants based on his laboratory work-up from Dr. Rae's office.  He does not have significant symptoms of systemic mastocytosis with no rashes unexplained weight loss or abnormalities on his peripheral blood CBC.  2.  Papillary carcinoma of the thyroid.  He appears to be in remission following thyroidectomy and radioactive iodine treatment with Dr. Zeeshan Badillo.    Recommendations  1.  We will draw additional labs from the office today including repeat serum tryptase and PCR for c-kit mutation and peripheral blood.  He also will have serum chemistry panel with serum LDH.  2.  We discussed possible bone marrow examination to rule out mast cells in the bone marrow.  After full explanation the patient was adamant that he does not want to pursue this test therefore we have not ordered bone marrow biopsy.  3.  We have not scheduled routine follow-up in our office but we will forward the results of the c-kit mutation to Dr. Rae when it becomes available.    Thanks for allowing us to see this nice gentleman in consultation  and we certainly would be happy to see him again anytime in the future if new concerns arise.

## 2023-03-14 NOTE — TELEPHONE ENCOUNTER
No PA needed for C-Kit Mut [id number] per [facility] Portal. Decision ID # [id number]. Ok'd lab to send to [facility] today.

## 2023-03-15 ENCOUNTER — PATIENT ROUNDING (BHMG ONLY) (OUTPATIENT)
Dept: ONCOLOGY | Facility: CLINIC | Age: 78
End: 2023-03-15
Payer: MEDICARE

## 2023-03-16 LAB — TRYPTASE SERPL-MCNC: 22.5 UG/L (ref 2.2–13.2)

## 2023-03-28 ENCOUNTER — OFFICE VISIT (OUTPATIENT)
Dept: FAMILY MEDICINE CLINIC | Facility: CLINIC | Age: 78
End: 2023-03-28
Payer: MEDICARE

## 2023-03-28 VITALS
SYSTOLIC BLOOD PRESSURE: 138 MMHG | BODY MASS INDEX: 29.39 KG/M2 | DIASTOLIC BLOOD PRESSURE: 80 MMHG | WEIGHT: 217 LBS | OXYGEN SATURATION: 98 % | HEIGHT: 72 IN | TEMPERATURE: 97.1 F | RESPIRATION RATE: 16 BRPM | HEART RATE: 84 BPM

## 2023-03-28 DIAGNOSIS — I10 PRIMARY HYPERTENSION: ICD-10-CM

## 2023-03-28 DIAGNOSIS — E11.9 TYPE 2 DIABETES MELLITUS WITHOUT COMPLICATION, WITHOUT LONG-TERM CURRENT USE OF INSULIN: ICD-10-CM

## 2023-03-28 DIAGNOSIS — E78.5 HYPERLIPIDEMIA, UNSPECIFIED HYPERLIPIDEMIA TYPE: Primary | ICD-10-CM

## 2023-03-28 NOTE — PROGRESS NOTES
The ABCs of the Annual Wellness Visit  Subsequent Medicare Wellness Visit    Subjective    Frank J Collesano is a 78 y.o. male who presents for a Subsequent Medicare Wellness Visit.    The following portions of the patient's history were reviewed and   updated as appropriate: allergies, current medications, past family history, past medical history, past social history, past surgical history and problem list.    Compared to one year ago, the patient feels his physical   health is the same.    Compared to one year ago, the patient feels his mental   health is worse.    Recent Hospitalizations:  He was not admitted to the hospital during the last year.       Current Medical Providers:  Patient Care Team:  Garret Dumont MD as PCP - General  Garret Dumont MD as Referring Physician (Internal Medicine)  Real Das MD as Consulting Physician (Hematology and Oncology)  Sandor Rae MD as Referring Physician (Allergy and Immunology)    Outpatient Medications Prior to Visit   Medication Sig Dispense Refill   • EPINEPHrine (EPIPEN) 0.3 MG/0.3ML solution auto-injector injection      • levothyroxine (SYNTHROID, LEVOTHROID) 137 MCG tablet TAKE 1 TABLET DAILY 90 tablet 3   • lisinopril-hydrochlorothiazide (PRINZIDE,ZESTORETIC) 20-12.5 MG per tablet TAKE 1 TABLET TWICE A  tablet 3   • metFORMIN (GLUCOPHAGE) 500 MG tablet TAKE 1 TABLET TWICE A DAY WITH MEALS 180 tablet 3   • pravastatin (PRAVACHOL) 20 MG tablet TAKE 1 TABLET EVERY NIGHT AT BEDTIME 90 tablet 3   • sildenafil (REVATIO) 20 MG tablet Take 1-5 po daily as needed for ED 60 tablet 2     No facility-administered medications prior to visit.       No opioid medication identified on active medication list. I have reviewed chart for other potential  high risk medication/s and harmful drug interactions in the elderly.          Aspirin is not on active medication list.  Aspirin use is not indicated based on review of current medical condition/s. Risk  "of harm outweighs potential benefits.  .    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  ACP discussion was held with the patient during this visit. Patient has an advance directive in EMR which is still valid.      Objective    Vitals:    03/28/23 1311   BP: 138/80   Pulse: 84   Resp: 16   Temp: 97.1 °F (36.2 °C)   TempSrc: Infrared   SpO2: 98%   Weight: 98.4 kg (217 lb)   Height: 182.9 cm (72.01\")     Estimated body mass index is 29.42 kg/m² as calculated from the following:    Height as of this encounter: 182.9 cm (72.01\").    Weight as of this encounter: 98.4 kg (217 lb).    BMI is >= 25 and <30. (Overweight) The following options were offered after discussion;: exercise counseling/recommendations and nutrition counseling/recommendations      Does the patient have evidence of cognitive impairment? No    Lab Results   Component Value Date    CHLPL 139 03/24/2023    TRIG 159 (H) 03/24/2023    HDL 43 03/24/2023    LDL 69 03/24/2023    VLDL 27 03/24/2023    HGBA1C 6.40 (H) 03/24/2023        HEALTH RISK ASSESSMENT    Smoking Status:  Social History     Tobacco Use   Smoking Status Never   Smokeless Tobacco Never     Alcohol Consumption:  Social History     Substance and Sexual Activity   Alcohol Use Not Currently     Fall Risk Screen:    STEADI Fall Risk Assessment was completed, and patient is at LOW risk for falls.Assessment completed on:3/28/2023    Depression Screening:  PHQ-2/PHQ-9 Depression Screening 3/28/2023   Little Interest or Pleasure in Doing Things 0-->not at all   Feeling Down, Depressed or Hopeless 1-->several days   PHQ-9: Brief Depression " Severity Measure Score 1       Health Habits and Functional and Cognitive Screening:  Functional & Cognitive Status 3/28/2023   Do you have difficulty preparing food and eating? No   Do you have difficulty bathing yourself, getting dressed or grooming yourself? No   Do you have difficulty using the toilet? No   Do you have difficulty moving around from place to place? No   Do you have trouble with steps or getting out of a bed or a chair? No   Current Diet Limited Junk Food   Dental Exam Up to date   Eye Exam Up to date   Exercise (times per week) 6 times per week   Current Exercises Include Walking;Yard Work;Cardiovascular Workout   Current Exercise Activities Include -   Do you need help using the phone?  No   Are you deaf or do you have serious difficulty hearing?  No   Do you need help with transportation? No   Do you need help shopping? No   Do you need help preparing meals?  No   Do you need help with housework?  No   Do you need help with laundry? No   Do you need help taking your medications? No   Do you need help managing money? No   Do you ever drive or ride in a car without wearing a seat belt? No   Have you felt unusual stress, anger or loneliness in the last month? No   Who do you live with? Other   If you need help, do you have trouble finding someone available to you? No   Have you been bothered in the last four weeks by sexual problems? Yes   Do you have difficulty concentrating, remembering or making decisions? No       Age-appropriate Screening Schedule:  Refer to the list below for future screening recommendations based on patient's age, sex and/or medical conditions. Orders for these recommended tests are listed in the plan section. The patient has been provided with a written plan.    Health Maintenance   Topic Date Due   • URINE MICROALBUMIN  03/17/2023   • COVID-19 Vaccine (4 - Booster for Pfizer series) 03/30/2023 (Originally 11/20/2021)   • HEMOGLOBIN A1C  09/24/2023   • DIABETIC EYE EXAM   "01/03/2024   • LIPID PANEL  03/24/2024   • ANNUAL WELLNESS VISIT  03/28/2024   • TDAP/TD VACCINES (2 - Td or Tdap) 07/07/2024   • COLORECTAL CANCER SCREENING  10/15/2024   • HEPATITIS C SCREENING  Completed   • INFLUENZA VACCINE  Completed   • Pneumococcal Vaccine 65+  Completed   • ZOSTER VACCINE  Discontinued                CMS Preventative Services Quick Reference  Risk Factors Identified During Encounter  Immunizations Discussed/Encouraged: Tdap, Hepatitis A Vaccine/Series, Influenza, Pneumococcal 23, Prevnar 20 (Pneumococcal 20-valent conjugate), Shingrix and COVID19  The above risks/problems have been discussed with the patient.  Pertinent information has been shared with the patient in the After Visit Summary.  An After Visit Summary and PPPS were made available to the patient.    Follow Up:   Next Medicare Wellness visit to be scheduled in 1 year.       Additional E&M Note during same encounter follows:  Patient has multiple medical problems which are significant and separately identifiable that require additional work above and beyond the Medicare Wellness Visit.      Chief Complaint  Medicare Wellness-subsequent and Hypertension    Subjective        HPI  Frank J Collesano is also being seen today for a blood pressure check and lab follow-up.  He has hypertension, hyperlipidemia, type 2 diabetes mellitus, surgical hypothyroidism, thyroid cancer.  He feels well.  He eats healthy and continues to exercise on a regular basis.  He experienced another episode of anaphylaxis secondary to ant bites when he was in [place].  He is seeing an allergist and also recently saw Dr. Das.  He does have an EpiPen.  He had vascular screening test almost 2 years ago which showed carotid artery plaque without stenosis.  He is followed by endocrinology annually.         Objective   Vital Signs:  /80   Pulse 84   Temp 97.1 °F (36.2 °C) (Infrared)   Resp 16   Ht 182.9 cm (72.01\")   Wt 98.4 kg (217 lb)   SpO2 98%   " BMI 29.42 kg/m²     Physical Exam  Vitals reviewed.   Constitutional:       Appearance: Normal appearance.   Neck:      Vascular: No carotid bruit.   Cardiovascular:      Rate and Rhythm: Normal rate and regular rhythm.      Heart sounds: Normal heart sounds.   Pulmonary:      Effort: Pulmonary effort is normal.      Breath sounds: Normal breath sounds.   Musculoskeletal:      Right lower leg: No edema.      Left lower leg: No edema.   Neurological:      Mental Status: He is alert.   Psychiatric:         Mood and Affect: Mood normal.         Behavior: Behavior normal.         Thought Content: Thought content normal.         Judgment: Judgment normal.                         Assessment and Plan   Diagnoses and all orders for this visit:    1. Hyperlipidemia, unspecified hyperlipidemia type (Primary)    2. Primary hypertension    3. Type 2 diabetes mellitus without complication, without long-term current use of insulin ([facility])  -     MicroAlbumin, Urine, Random - Urine, Clean Catch    Blood pressure is well controlled.  Fasting glucose is elevated and hemoglobin A1c is stable at 6.4.  Triglycerides are mildly elevated 159.  LDL and HDL cholesterol are normal.  Kidney functions are normal.  Prostate-specific antigen is normal.  Continue diabetic healthy heart diet, exercise, current medicines.  Discussed anaphylaxis at length.         Follow Up   Return in about 6 months (around 9/28/2023) for cmp, lipid, a1c.  Patient was given instructions and counseling regarding his condition or for health maintenance advice. Please see specific information pulled into the AVS if appropriate.

## 2023-03-29 LAB — MICROALBUMIN UR-MCNC: 4.3 UG/ML

## 2023-05-23 RX ORDER — LISINOPRIL AND HYDROCHLOROTHIAZIDE 20; 12.5 MG/1; MG/1
TABLET ORAL
Qty: 180 TABLET | Refills: 3 | Status: SHIPPED | OUTPATIENT
Start: 2023-05-23

## 2023-08-21 RX ORDER — PRAVASTATIN SODIUM 20 MG
TABLET ORAL
Qty: 90 TABLET | Refills: 3 | Status: SHIPPED | OUTPATIENT
Start: 2023-08-21

## 2023-09-28 DIAGNOSIS — E11.9 TYPE 2 DIABETES MELLITUS WITHOUT COMPLICATION, WITHOUT LONG-TERM CURRENT USE OF INSULIN: Primary | ICD-10-CM

## 2023-09-28 DIAGNOSIS — E78.5 HYPERLIPIDEMIA, UNSPECIFIED HYPERLIPIDEMIA TYPE: ICD-10-CM

## 2023-09-30 LAB
ALBUMIN SERPL-MCNC: 4.3 G/DL (ref 3.8–4.8)
ALBUMIN/GLOB SERPL: 1.8 {RATIO} (ref 1.2–2.2)
ALP SERPL-CCNC: 43 IU/L (ref 44–121)
ALT SERPL-CCNC: 19 IU/L (ref 0–44)
AST SERPL-CCNC: 20 IU/L (ref 0–40)
BILIRUB SERPL-MCNC: 0.7 MG/DL (ref 0–1.2)
BUN SERPL-MCNC: 18 MG/DL (ref 8–27)
BUN/CREAT SERPL: 14 (ref 10–24)
CALCIUM SERPL-MCNC: 9.2 MG/DL (ref 8.6–10.2)
CHLORIDE SERPL-SCNC: 97 MMOL/L (ref 96–106)
CHOLEST SERPL-MCNC: 129 MG/DL (ref 100–199)
CO2 SERPL-SCNC: 27 MMOL/L (ref 20–29)
CREAT SERPL-MCNC: 1.31 MG/DL (ref 0.76–1.27)
CREAT UR-MCNC: NORMAL MG/DL
EGFRCR SERPLBLD CKD-EPI 2021: 56 ML/MIN/1.73
GLOBULIN SER CALC-MCNC: 2.4 G/DL (ref 1.5–4.5)
GLUCOSE SERPL-MCNC: 112 MG/DL (ref 70–99)
HBA1C MFR BLD: 6.4 % (ref 4.8–5.6)
HDLC SERPL-MCNC: 41 MG/DL
LDLC SERPL CALC-MCNC: 63 MG/DL (ref 0–99)
LDLC/HDLC SERPL: 1.5 RATIO (ref 0–3.6)
MICROALBUMIN UR-MCNC: NORMAL
POTASSIUM SERPL-SCNC: 4.7 MMOL/L (ref 3.5–5.2)
PROT SERPL-MCNC: 6.7 G/DL (ref 6–8.5)
SODIUM SERPL-SCNC: 137 MMOL/L (ref 134–144)
SPECIMEN STATUS: NORMAL
TRIGL SERPL-MCNC: 141 MG/DL (ref 0–149)
VLDLC SERPL CALC-MCNC: 25 MG/DL (ref 5–40)

## 2023-10-03 ENCOUNTER — OFFICE VISIT (OUTPATIENT)
Dept: FAMILY MEDICINE CLINIC | Facility: CLINIC | Age: 78
End: 2023-10-03
Payer: MEDICARE

## 2023-10-03 VITALS
DIASTOLIC BLOOD PRESSURE: 80 MMHG | HEIGHT: 72 IN | WEIGHT: 213 LBS | SYSTOLIC BLOOD PRESSURE: 138 MMHG | OXYGEN SATURATION: 97 % | BODY MASS INDEX: 28.85 KG/M2 | HEART RATE: 69 BPM

## 2023-10-03 DIAGNOSIS — E78.5 HYPERLIPIDEMIA, UNSPECIFIED HYPERLIPIDEMIA TYPE: ICD-10-CM

## 2023-10-03 DIAGNOSIS — R79.89 SERUM CREATININE RAISED: ICD-10-CM

## 2023-10-03 DIAGNOSIS — I10 PRIMARY HYPERTENSION: Primary | ICD-10-CM

## 2023-10-03 DIAGNOSIS — E11.9 TYPE 2 DIABETES MELLITUS WITHOUT COMPLICATION, WITHOUT LONG-TERM CURRENT USE OF INSULIN: ICD-10-CM

## 2023-10-03 RX ORDER — LEVOTHYROXINE SODIUM 0.15 MG/1
150 TABLET ORAL DAILY
COMMUNITY
Start: 2023-09-18

## 2023-10-03 NOTE — PROGRESS NOTES
Subjective   Frank J Collesano is a 78 y.o. male. Patient is here today for   Chief Complaint   Patient presents with    Follow-up    Hyperlipidemia          Vitals:    10/03/23 1300   BP: 138/80   Pulse: 69   SpO2: 97%     Body mass index is 28.88 kg/m².      The following portions of the patient's history were reviewed and updated as appropriate: allergies, current medications, past family history, past medical history, past social history, past surgical history and problem list.    Past Medical History:   Diagnosis Date    Allergic Ants    Cancer Thyroid    Yes    Cataract 8/2015    Diabetes mellitus     Hyperlipidemia     Hypertension       No Known Allergies   Social History     Socioeconomic History    Marital status: Single   Tobacco Use    Smoking status: Never    Smokeless tobacco: Never   Vaping Use    Vaping Use: Never used   Substance and Sexual Activity    Alcohol use: Not Currently    Drug use: No    Sexual activity: Yes     Partners: Female        Current Outpatient Medications:     levothyroxine (SYNTHROID, LEVOTHROID) 150 MCG tablet, Take 1 tablet by mouth Daily., Disp: , Rfl:     EPINEPHrine (EPIPEN) 0.3 MG/0.3ML solution auto-injector injection, , Disp: , Rfl:     lisinopril-hydrochlorothiazide (PRINZIDE,ZESTORETIC) 20-12.5 MG per tablet, TAKE 1 TABLET TWICE A DAY, Disp: 180 tablet, Rfl: 3    metFORMIN (GLUCOPHAGE) 500 MG tablet, TAKE 1 TABLET TWICE A DAY WITH MEALS, Disp: 180 tablet, Rfl: 3    pravastatin (PRAVACHOL) 20 MG tablet, TAKE 1 TABLET EVERY NIGHT AT BEDTIME, Disp: 90 tablet, Rfl: 3     Objective   History of Present Illness Be is here for blood pressure check and lab follow-up.  He has hypertension, hyperlipidemia, type 2 diabetes mellitus, carotid atherosclerosis, surgical hypothyroidism, and thyroid cancer status post thyroidectomy.  He feels well.  He continues to eat healthy and exercises on a daily basis.  He monitors his blood pressure reports stable readings.  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  He has an elevated tryptase level and it has been recommended that he have a bone marrow biopsy which he did not want to do.  He had vascular screening 2-1/2 years ago and had unilateral carotid artery plaque without stenosis.    Review of Systems   Constitutional:  Negative for activity change and unexpected weight change.   Respiratory:  Negative for shortness of breath.    Cardiovascular:  Negative for leg swelling.   Neurological: Negative.    Psychiatric/Behavioral: Negative.       Physical Exam  Vitals reviewed.   Constitutional:       Appearance: Normal appearance.   Cardiovascular:      Rate and Rhythm: Normal rate and regular rhythm.      Heart sounds: Normal heart sounds.      Comments: 122/70  Pulmonary:      Effort: Pulmonary effort is normal.      Breath sounds: Normal breath sounds.   Musculoskeletal:      Right lower leg: No edema.      Left lower leg: No edema.   Neurological:      Mental Status: He is alert.   Psychiatric:         Mood and Affect: Mood normal.         Behavior: Behavior normal.         Thought Content: Thought content normal.         Judgment: Judgment normal.       Assessment blood pressure is well controlled.  Fasting glucose is 112 and hemoglobin A1c is stable at 6.4.  Lipids are excellent.  Serum creatinine is 1.31.  Microalbumin creatinine ratio was not done due to inadequate specimen.  Will reorder.  Continue no sugar healthy heart diet, exercise, and current medicines.  Discussed elevated tryptase level and bone marrow biopsy.  Discussed appropriate immunizations.  Suggest getting vascular screening test again.  ASSESSMENT    Problems Addressed this Visit          Cardiac and Vasculature    Hyperlipidemia    Hypertension - Primary       Endocrine and Metabolic    Diabetes mellitus     Diagnoses         Codes Comments    Primary hypertension    -  Primary ICD-10-CM: I10  ICD-9-CM: 401.9     Hyperlipidemia,  unspecified hyperlipidemia type     ICD-10-CM: E78.5  ICD-9-CM: 272.4     Type 2 diabetes mellitus without complication, without long-term current use of insulin     ICD-10-CM: E11.9  ICD-9-CM: 250.00             PLAN  There are no Patient Instructions on file for this visit.    No follow-ups on file.Answers submitted by the patient for this visit:  Primary Reason for Visit (Submitted on 9/26/2023)  What is the primary reason for your visit?: Physical

## 2023-10-04 ENCOUNTER — TRANSCRIBE ORDERS (OUTPATIENT)
Dept: ADMINISTRATIVE | Facility: HOSPITAL | Age: 78
End: 2023-10-04
Payer: MEDICARE

## 2023-10-04 DIAGNOSIS — Z13.6 ENCOUNTER FOR SCREENING FOR VASCULAR DISEASE: Primary | ICD-10-CM

## 2023-10-04 LAB
ALBUMIN/CREAT UR: 5 MG/G CREAT (ref 0–29)
CREAT UR-MCNC: 81 MG/DL
MICROALBUMIN UR-MCNC: 4.2 UG/ML

## 2023-10-12 ENCOUNTER — TELEPHONE (OUTPATIENT)
Dept: ONCOLOGY | Facility: CLINIC | Age: 78
End: 2023-10-12
Payer: MEDICARE

## 2023-10-12 NOTE — TELEPHONE ENCOUNTER
Caller: Collesano, Frank J    Relationship to patient: Self    Best call back number: [phone]    Chief complaint: REQUEST FOLLOW UP TO DISCUSS WITH DR DELUCA ABOUT BONE MARROW PROCEDURE    Type of visit: FOLLOW UP    Requested date: ANYDAY IN OCTOBER EXCEPT 10/25    AND ANYTIME AFTER 9AM    If rescheduling, when is the original appointment: N/A

## 2023-10-26 LAB — REF LAB TEST RESULTS: NORMAL

## 2023-10-31 ENCOUNTER — OFFICE VISIT (OUTPATIENT)
Dept: ONCOLOGY | Facility: CLINIC | Age: 78
End: 2023-10-31
Payer: MEDICARE

## 2023-10-31 VITALS
WEIGHT: 215.7 LBS | SYSTOLIC BLOOD PRESSURE: 161 MMHG | RESPIRATION RATE: 16 BRPM | HEART RATE: 74 BPM | DIASTOLIC BLOOD PRESSURE: 104 MMHG | BODY MASS INDEX: 29.22 KG/M2 | HEIGHT: 72 IN | OXYGEN SATURATION: 98 % | TEMPERATURE: 97.5 F

## 2023-10-31 DIAGNOSIS — R74.8 ELEVATED SERUM TRYPTASE: ICD-10-CM

## 2023-10-31 DIAGNOSIS — T63.481A ANAPHYLAXIS DUE TO INSECT VENOM: Primary | ICD-10-CM

## 2023-10-31 DIAGNOSIS — C73 PRIMARY THYROID PAPILLARY CARCINOMA: ICD-10-CM

## 2023-10-31 DIAGNOSIS — D47.09 MASTOCYTOSIS: ICD-10-CM

## 2023-10-31 NOTE — PROGRESS NOTES
Subjective     REASON FOR FOLLOW UP: Mastocytosis.    HISTORY OF PRESENT ILLNESS:  The patient is a 78 y.o. year old male who is here for an opinion about the above issue.  He appears remarkably healthy for his age but has an interesting history of having severe reactions to ant bites and most recently he developed a severe anaphylactic reaction to multiple bites from fire ants in [place].    He has been evaluated by Dr. 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  As part of his work-up he also was found to have an elevated tryptase level and for this reason he is referred to us for evaluation of possible systemic mastocytosis.    With his initial consult visit on 3/14/2023 he was agreeable to have some additional labs performed in the office today but when we discussed proceeding with a bone marrow examination he has steadfastly refused.  I did not argue too much as our suspicion for severe mastocytosis was relatively low.    He has a history of thyroid carcinoma which was treated with complete thyroidectomy followed by radioactive iodine treatment.     INTERVAL HISTORY:  Be did not keep his originally scheduled follow-up visit but requested an appointment today to again discuss the situation and decide if he may wish to undergo bone marrow examination.  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    On today's visit he reports that he is still feeling fine.  He is not having any diffuse rashes or itching.  He is not having any unexplained weight loss, fevers, chills, night sweats, or lymphadenopathy.    He tells me that he has now undergone desensitization for ant venom and is carrying an EpiPen.    History of Present Illness     Past Medical History:   Diagnosis Date    Allergic Ants    Cancer Thyroid    Yes    Cataract 8/2015    Diabetes mellitus     Hyperlipidemia     Hypertension         Past Surgical History:    Procedure Laterality Date    APPENDECTOMY      COLONOSCOPY      EYE SURGERY      LYMPH NODE BIOPSY  4/2022        Current Outpatient Medications on File Prior to Visit   Medication Sig Dispense Refill    EPINEPHrine (EPIPEN) 0.3 MG/0.3ML solution auto-injector injection       levothyroxine (SYNTHROID, LEVOTHROID) 150 MCG tablet Take 1 tablet by mouth Daily.      lisinopril-hydrochlorothiazide (PRINZIDE,ZESTORETIC) 20-12.5 MG per tablet TAKE 1 TABLET TWICE A  tablet 3    metFORMIN (GLUCOPHAGE) 500 MG tablet TAKE 1 TABLET TWICE A DAY WITH MEALS 180 tablet 3    pravastatin (PRAVACHOL) 20 MG tablet TAKE 1 TABLET EVERY NIGHT AT BEDTIME 90 tablet 3     No current facility-administered medications on file prior to visit.        ALLERGIES:  No Known Allergies     Social History     Socioeconomic History    Marital status: Single   Tobacco Use    Smoking status: Never    Smokeless tobacco: Never   Vaping Use    Vaping Use: Never used   Substance and Sexual Activity    Alcohol use: Not Currently    Drug use: No    Sexual activity: Yes     Partners: Female        Family History   Problem Relation Age of Onset    Aneurysm Father     Early death Father         Stroke    Stroke Father     Diabetes Sister     Early death Mother         Review of Systems   Constitutional:  Negative for activity change, chills, fatigue and fever.   HENT:  Negative for mouth sores, trouble swallowing and voice change.    Eyes:  Negative for pain and visual disturbance.   Respiratory:  Negative for cough, shortness of breath and wheezing.    Cardiovascular:  Negative for chest pain and palpitations.   Gastrointestinal:  Negative for abdominal pain, constipation, diarrhea, nausea and vomiting.   Genitourinary:  Negative for difficulty urinating, frequency and urgency.   Musculoskeletal:  Negative for arthralgias and joint swelling.   Skin:  Negative for rash.   Neurological:  Negative for dizziness, seizures, weakness and headaches.  "  Hematological:  Negative for adenopathy. Does not bruise/bleed easily.   Psychiatric/Behavioral:  Negative for behavioral problems and confusion. The patient is not nervous/anxious.         Objective     Vitals:    10/31/23 1053   BP: (!) 161/104   Pulse: 74   Resp: 16   Temp: 97.5 °F (36.4 °C)   TempSrc: Temporal   SpO2: 98%   Weight: 97.8 kg (215 lb 11.2 oz)   Height: 182.9 cm (72.01\")   PainSc: 0-No pain         10/31/2023    10:54 AM   Current Status   ECOG score 0       Physical Exam  Constitutional:       General: He is not in acute distress.     Appearance: He is well-developed.   HENT:      Head: Normocephalic.   Eyes:      General: No scleral icterus.     Conjunctiva/sclera: Conjunctivae normal.      Pupils: Pupils are equal, round, and reactive to light.   Neck:      Thyroid: No thyromegaly.      Vascular: No JVD.      Comments: Surgical scar from thyroidectomy  Cardiovascular:      Rate and Rhythm: Normal rate and regular rhythm.      Heart sounds: No murmur heard.     No friction rub. No gallop.   Pulmonary:      Effort: Pulmonary effort is normal.      Breath sounds: Normal breath sounds. No wheezing or rales.   Abdominal:      General: There is no distension.      Palpations: Abdomen is soft. There is no mass.      Tenderness: There is no abdominal tenderness.   Musculoskeletal:         General: No deformity. Normal range of motion.      Cervical back: Normal range of motion and neck supple.   Lymphadenopathy:      Cervical: No cervical adenopathy.   Skin:     General: Skin is warm and dry.      Findings: No erythema or rash.   Neurological:      Mental Status: He is alert and oriented to person, place, and time.      Cranial Nerves: No cranial nerve deficit.      Deep Tendon Reflexes: Reflexes are normal and symmetric.   Psychiatric:         Behavior: Behavior normal.         Judgment: Judgment normal.           RECENT LABS:  Hematology WBC   Date Value Ref Range Status   03/24/2023 5.85 3.40 - " 10.80 10*3/mm3 Final   04/27/2022 4.37 (L) 4.5 - 11.0 10*3/uL Final     RBC   Date Value Ref Range Status   03/24/2023 4.71 4.14 - 5.80 10*6/mm3 Final   04/27/2022 4.96 4.5 - 5.9 10*6/uL Final     Hemoglobin   Date Value Ref Range Status   03/24/2023 15.2 13.0 - 17.7 g/dL Final   03/14/2023 14.8 13.0 - 17.7 g/dL Final   04/27/2022 14.9 13.5 - 17.5 g/dL Final     Hematocrit   Date Value Ref Range Status   03/24/2023 43.1 37.5 - 51.0 % Final   03/14/2023 43.9 37.5 - 51.0 % Final   04/27/2022 45.4 41.0 - 53.0 % Final     Platelets   Date Value Ref Range Status   03/24/2023 292 140 - 450 10*3/mm3 Final   03/14/2023 294 140 - 450 10*3/mm3 Final   04/27/2022 366 140 - 440 10*3/uL Final      Tryptase level 20.6 ug/L  3/1/2023    Assessment & Plan     1.  Elevated tryptase level in a gentleman with a history of anaphylaxis following fire ant bites.  He does appear to be allergic to fire ants based on his laboratory work-up from Dr. Rae's office.  He does not have significant symptoms of aggressive systemic mastocytosis with no rashes unexplained weight loss or abnormalities on his peripheral blood CBC.  2.  Papillary carcinoma of the thyroid.  He appears to be in remission following thyroidectomy and radioactive iodine treatment with Dr. Zeeshan Badillo.    Recommendations  1.  We again had a lengthy discussion with Be regarding the diagnosis and work-up for mastocytosis.  We explained that a bone marrow biopsy is part of the normal work-up for mastocytosis both to determine the mast cell burden in the marrow and also to look for any underlying myeloid neoplasms that may be associated with mastocytosis.  2.  He remains reluctant to undergo the procedure mainly because he is feeling fine and having minimal symptoms.  I told him if he does not undergo the bone marrow test that we certainly should continue to follow him in our office to monitor his blood counts and to look for any evolving symptoms.  3.  We have scheduled  a follow-up in March with plans to draw labs 1 week prior to that visit including a CBC, serum chemistries, LDH, and tryptase level and erythrocyte sedimentation rate.  4.  We did tell him that if he changes his mind about proceeding with a bone marrow he could always call us and we could schedule this at the interventional radiology department at [facility].  5.  He will also be continuing follow-up with his allergy/immunology provider.  6.  He also will continue follow-up with Dr. Zeeshan Badillo of radiation oncology for his treated thyroid carcinoma.

## 2024-01-22 ENCOUNTER — HOSPITAL ENCOUNTER (OUTPATIENT)
Dept: CARDIOLOGY | Facility: HOSPITAL | Age: 79
Discharge: HOME | End: 2024-01-22
Admitting: INTERNAL MEDICINE
Payer: MEDICARE

## 2024-01-22 VITALS
BODY MASS INDEX: 29.12 KG/M2 | DIASTOLIC BLOOD PRESSURE: 96 MMHG | SYSTOLIC BLOOD PRESSURE: 184 MMHG | HEART RATE: 68 BPM | HEIGHT: 72 IN | WEIGHT: 215 LBS

## 2024-01-22 DIAGNOSIS — Z13.6 ENCOUNTER FOR SCREENING FOR VASCULAR DISEASE: ICD-10-CM

## 2024-01-22 LAB
BH CV ECHO MEAS - DIST AO DIAM: 1.57 CM
BH CV VAS BP LEFT ARM: NORMAL MMHG
BH CV VAS BP RIGHT ARM: NORMAL MMHG
BH CV VAS SCREENING CAROTID CCA LEFT: NORMAL CM/SEC
BH CV VAS SCREENING CAROTID CCA RIGHT: NORMAL CM/SEC
BH CV VAS SCREENING CAROTID ICA LEFT: NORMAL CM/SEC
BH CV VAS SCREENING CAROTID ICA RIGHT: NORMAL CM/SEC
BH CV XLRA MEAS - MID AO DIAM: 1.7 CM
BH CV XLRA MEAS - PAD LEFT ABI DP: NORMAL
BH CV XLRA MEAS - PAD LEFT ABI PT: NORMAL
BH CV XLRA MEAS - PAD LEFT ARM: 182 MMHG
BH CV XLRA MEAS - PAD LEFT LEG DP: NORMAL MMHG
BH CV XLRA MEAS - PAD LEFT LEG PT: NORMAL MMHG
BH CV XLRA MEAS - PAD RIGHT ABI DP: NORMAL
BH CV XLRA MEAS - PAD RIGHT ABI PT: NORMAL
BH CV XLRA MEAS - PAD RIGHT ARM: 184 MMHG
BH CV XLRA MEAS - PAD RIGHT LEG DP: NORMAL MMHG
BH CV XLRA MEAS - PAD RIGHT LEG PT: NORMAL MMHG
BH CV XLRA MEAS - PROX AO DIAM: 2.17 CM
BH CV XLRA MEAS LEFT ICA/CCA RATIO: 0.95
BH CV XLRA MEAS LEFT PROX ECA PSV: NORMAL CM/SEC
BH CV XLRA MEAS RIGHT ICA/CCA RATIO: 1.02
BH CV XLRA MEAS RIGHT PROX ECA PSV: NORMAL CM/SEC
MAXIMAL PREDICTED HEART RATE: 142 BPM
STRESS TARGET HR: 121 BPM

## 2024-01-22 PROCEDURE — 93799 UNLISTED CV SVC/PROCEDURE: CPT

## 2024-02-28 ENCOUNTER — TELEPHONE (OUTPATIENT)
Dept: ONCOLOGY | Facility: CLINIC | Age: 79
End: 2024-02-28
Payer: MEDICARE

## 2024-02-28 NOTE — TELEPHONE ENCOUNTER
"CALLED PT - PT WANTED TO CANCEL APPTS- STATES HE HAS \" A LOT GOING ON RIGHT NOW\" AND DID NOT WANT TO R/S AT THIS TIME- TOLD PT TO CALL US AND THAT DR DELUCA WOULD BE RETIRING PT V/U  "

## 2024-02-28 NOTE — TELEPHONE ENCOUNTER
Caller: Collesano, Frank J    Relationship to patient: Self    Best call back number: [phone]    Chief complaint: PT CALLED TO CANCEL APPOINTMENTS    Type of visit: LAB, VITALS AND FOLLOW UP      If rescheduling, when is the original appointment: 3-19-24 AND 3-26-24

## 2024-04-09 DIAGNOSIS — R73.01 ELEVATED FASTING BLOOD SUGAR: ICD-10-CM

## 2024-04-09 DIAGNOSIS — I10 PRIMARY HYPERTENSION: ICD-10-CM

## 2024-04-09 DIAGNOSIS — E78.5 HYPERLIPIDEMIA, UNSPECIFIED HYPERLIPIDEMIA TYPE: Primary | ICD-10-CM

## 2024-04-10 LAB
BASOPHILS # BLD AUTO: 0.02 10*3/MM3 (ref 0–0.2)
BASOPHILS NFR BLD AUTO: 0.4 % (ref 0–1.5)
CHOLEST SERPL-MCNC: 135 MG/DL (ref 0–200)
EOSINOPHIL # BLD AUTO: 0.08 10*3/MM3 (ref 0–0.4)
EOSINOPHIL NFR BLD AUTO: 1.6 % (ref 0.3–6.2)
ERYTHROCYTE [DISTWIDTH] IN BLOOD BY AUTOMATED COUNT: 12.3 % (ref 12.3–15.4)
HBA1C MFR BLD: 6.6 % (ref 4.8–5.6)
HCT VFR BLD AUTO: 44.8 % (ref 37.5–51)
HDLC SERPL-MCNC: 41 MG/DL (ref 40–60)
HGB BLD-MCNC: 15 G/DL (ref 13–17.7)
IMM GRANULOCYTES # BLD AUTO: 0.01 10*3/MM3 (ref 0–0.05)
IMM GRANULOCYTES NFR BLD AUTO: 0.2 % (ref 0–0.5)
LDLC SERPL CALC-MCNC: 68 MG/DL (ref 0–100)
LDLC/HDLC SERPL: 1.56 {RATIO}
LYMPHOCYTES # BLD AUTO: 1.78 10*3/MM3 (ref 0.7–3.1)
LYMPHOCYTES NFR BLD AUTO: 35.3 % (ref 19.6–45.3)
MCH RBC QN AUTO: 31 PG (ref 26.6–33)
MCHC RBC AUTO-ENTMCNC: 33.5 G/DL (ref 31.5–35.7)
MCV RBC AUTO: 92.6 FL (ref 79–97)
MONOCYTES # BLD AUTO: 0.63 10*3/MM3 (ref 0.1–0.9)
MONOCYTES NFR BLD AUTO: 12.5 % (ref 5–12)
NEUTROPHILS # BLD AUTO: 2.52 10*3/MM3 (ref 1.7–7)
NEUTROPHILS NFR BLD AUTO: 50 % (ref 42.7–76)
NRBC BLD AUTO-RTO: 0 /100 WBC (ref 0–0.2)
PLATELET # BLD AUTO: 296 10*3/MM3 (ref 140–450)
RBC # BLD AUTO: 4.84 10*6/MM3 (ref 4.14–5.8)
TRIGL SERPL-MCNC: 150 MG/DL (ref 0–150)
VLDLC SERPL CALC-MCNC: 26 MG/DL (ref 5–40)
WBC # BLD AUTO: 5.04 10*3/MM3 (ref 3.4–10.8)

## 2024-04-12 ENCOUNTER — OFFICE VISIT (OUTPATIENT)
Dept: FAMILY MEDICINE CLINIC | Facility: CLINIC | Age: 79
End: 2024-04-12
Payer: MEDICARE

## 2024-04-12 VITALS
DIASTOLIC BLOOD PRESSURE: 70 MMHG | HEIGHT: 72 IN | RESPIRATION RATE: 16 BRPM | WEIGHT: 218 LBS | HEART RATE: 87 BPM | BODY MASS INDEX: 29.53 KG/M2 | SYSTOLIC BLOOD PRESSURE: 124 MMHG | OXYGEN SATURATION: 96 %

## 2024-04-12 DIAGNOSIS — D47.09 MASTOCYTOSIS: ICD-10-CM

## 2024-04-12 DIAGNOSIS — E78.5 HYPERLIPIDEMIA, UNSPECIFIED HYPERLIPIDEMIA TYPE: Primary | ICD-10-CM

## 2024-04-12 DIAGNOSIS — E11.9 TYPE 2 DIABETES MELLITUS WITHOUT COMPLICATION, WITHOUT LONG-TERM CURRENT USE OF INSULIN: ICD-10-CM

## 2024-04-12 DIAGNOSIS — I10 PRIMARY HYPERTENSION: ICD-10-CM

## 2024-04-12 DIAGNOSIS — I49.3 PREMATURE VENTRICULAR CONTRACTIONS: ICD-10-CM

## 2024-04-12 NOTE — PROGRESS NOTES
The ABCs of the Annual Wellness Visit  Subsequent Medicare Wellness Visit    Subjective    Frank J Collesano is a 79 y.o. male who presents for a Subsequent Medicare Wellness Visit.    The following portions of the patient's history were reviewed and   updated as appropriate: allergies, current medications, past family history, past medical history, past social history, past surgical history, and problem list.    Compared to one year ago, the patient feels his physical   health is the same.    Compared to one year ago, the patient feels his mental   health is the same.    Recent Hospitalizations:  He was not admitted to the hospital during the last year.       Current Medical Providers:  Patient Care Team:  Garret Dumont MD as PCP - General (Internal Medicine)  Garret Dumont MD as Referring Physician (Internal Medicine)  Real Das MD as Consulting Physician (Hematology and Oncology)  Sandor Rae MD as Referring Physician (Allergy and Immunology)    Outpatient Medications Prior to Visit   Medication Sig Dispense Refill    levothyroxine (SYNTHROID, LEVOTHROID) 150 MCG tablet Take 1 tablet by mouth Daily.      lisinopril-hydrochlorothiazide (PRINZIDE,ZESTORETIC) 20-12.5 MG per tablet TAKE 1 TABLET TWICE A  tablet 3    metFORMIN (GLUCOPHAGE) 500 MG tablet TAKE 1 TABLET TWICE A DAY WITH MEALS 180 tablet 3    pravastatin (PRAVACHOL) 20 MG tablet TAKE 1 TABLET EVERY NIGHT AT BEDTIME 90 tablet 3    EPINEPHrine (EPIPEN) 0.3 MG/0.3ML solution auto-injector injection        No facility-administered medications prior to visit.       No opioid medication identified on active medication list. I have reviewed chart for other potential  high risk medication/s and harmful drug interactions in the elderly.        Aspirin is not on active medication list.  Aspirin use is not indicated based on review of current medical condition/s. Risk of harm outweighs potential benefits.  .    Patient Active Problem  "List   Diagnosis    Diabetes mellitus    Serum creatinine raised    Hyperlipidemia    Hypertension    Suprapubic abdominal pain    Lower abdominal pain    Decreased libido    Mass of right side of neck    Primary thyroid papillary carcinoma    Elevated serum tryptase    Anaphylaxis due to insect venom    Mastocytosis     Advance Care Planning   Advance Care Planning     Advance Directive is on file.  ACP discussion was held with the patient during this visit. Patient has an advance directive in EMR which is still valid.      Objective    Vitals:    24 1317   BP: 124/70   Pulse: 87   Resp: 16   SpO2: 96%   Weight: 98.9 kg (218 lb)   Height: 182.9 cm (72.01\")     Estimated body mass index is 29.56 kg/m² as calculated from the following:    Height as of this encounter: 182.9 cm (72.01\").    Weight as of this encounter: 98.9 kg (218 lb).           Does the patient have evidence of cognitive impairment? No    Lab Results   Component Value Date    CHLPL 135 2024    TRIG 150 2024    HDL 41 2024    LDL 68 2024    VLDL 26 2024    HGBA1C 6.60 (H) 2024        HEALTH RISK ASSESSMENT    Smoking Status:  Social History     Tobacco Use   Smoking Status Never   Smokeless Tobacco Never     Alcohol Consumption:  Social History     Substance and Sexual Activity   Alcohol Use Not Currently     Fall Risk Screen:    STEADI Fall Risk Assessment was completed, and patient is at LOW risk for falls.Assessment completed on:2024    Depression Screenin/12/2024     1:20 PM   PHQ-2/PHQ-9 Depression Screening   Little Interest or Pleasure in Doing Things 0-->not at all   Feeling Down, Depressed or Hopeless 1-->several days   PHQ-9: Brief Depression Severity Measure Score 1       Health Habits and Functional and Cognitive Screenin/12/2024     1:18 PM   Functional & Cognitive Status   Do you have difficulty preparing food and eating? No   Do you have difficulty bathing yourself, " getting dressed or grooming yourself? No   Do you have difficulty using the toilet? No   Do you have difficulty moving around from place to place? No   Do you have trouble with steps or getting out of a bed or a chair? No   Dental Exam Up to date   Eye Exam Up to date   Exercise (times per week) 6 times per week   Current Exercises Include Yard Work;Walking;Stationary Bicycling/Spin Class   Do you need help using the phone?  No   Are you deaf or do you have serious difficulty hearing?  No   Do you need help to go to places out of walking distance? No   Do you need help shopping? No   Do you need help preparing meals?  No   Do you need help with housework?  No   Do you need help with laundry? No   Do you need help taking your medications? No   Do you need help managing money? No   Do you ever drive or ride in a car without wearing a seat belt? No   Have you felt unusual stress, anger or loneliness in the last month? No   Who do you live with? Other   If you need help, do you have trouble finding someone available to you? No   Have you been bothered in the last four weeks by sexual problems? No   Do you have difficulty concentrating, remembering or making decisions? No       Age-appropriate Screening Schedule:  Refer to the list below for future screening recommendations based on patient's age, sex and/or medical conditions. Orders for these recommended tests are listed in the plan section. The patient has been provided with a written plan.    Health Maintenance   Topic Date Due    RSV Vaccine - Adults (1 - 1-dose 60+ series) Never done    COVID-19 Vaccine (4 - 2023-24 season) 09/01/2023    TDAP/TD VACCINES (2 - Td or Tdap) 07/07/2024    INFLUENZA VACCINE  08/01/2024    URINE MICROALBUMIN  10/03/2024    HEMOGLOBIN A1C  10/09/2024    COLORECTAL CANCER SCREENING  10/15/2024    DIABETIC EYE EXAM  01/05/2025    LIPID PANEL  04/09/2025    ANNUAL WELLNESS VISIT  04/12/2025    BMI FOLLOWUP  04/12/2025    HEPATITIS C  "SCREENING  Completed    Pneumococcal Vaccine 65+  Completed    ZOSTER VACCINE  Discontinued                  CMS Preventative Services Quick Reference  Risk Factors Identified During Encounter  Immunizations Discussed/Encouraged: COVID19 and RSV (Respiratory Syncytial Virus)  The above risks/problems have been discussed with the patient.  Pertinent information has been shared with the patient in the After Visit Summary.  An After Visit Summary and PPPS were made available to the patient.    Follow Up:   Next Medicare Wellness visit to be scheduled in 1 year.       Additional E&M Note during same encounter follows:  Patient has multiple medical problems which are significant and separately identifiable that require additional work above and beyond the Medicare Wellness Visit.      Chief Complaint  Medicare Wellness-subsequent    Subjective        HPI  Frank J Collesano is also being seen today for blood pressure check and lab follow-up.  He has hypertension, hyperlipidemia, stable type 2 diabetes mellitus, surgical hypothyroidism, papillary carcinoma of the thyroid, and mastocytosis.  He feels well as always.  He has not been eating as healthy as he usually does but still eats healthier than most.  He continues to exercise vigorously on a daily basis.  He monitors his blood pressure and reports variable readings.  He had vascular screening test in January and had unilateral carotid artery plaque without stenosis.         Objective   Vital Signs:  /70   Pulse 87   Resp 16   Ht 182.9 cm (72.01\")   Wt 98.9 kg (218 lb)   SpO2 96%   BMI 29.56 kg/m²     Physical Exam  Vitals reviewed.   Constitutional:       Appearance: Normal appearance.   Cardiovascular:      Rate and Rhythm: Normal rate and regular rhythm. Frequent Extrasystoles are present.     Heart sounds: Normal heart sounds.      Comments: 110/70  Pulmonary:      Effort: Pulmonary effort is normal.      Breath sounds: Normal breath sounds. "   Musculoskeletal:      Right lower leg: No edema.      Left lower leg: No edema.   Neurological:      Mental Status: He is alert.   Psychiatric:         Mood and Affect: Mood normal.         Behavior: Behavior normal.         Thought Content: Thought content normal.         Judgment: Judgment normal.                         Assessment and Plan blood pressure is well-controlled.  Lipids are normal.  Hemoglobin A1c is stable at 6.6.  EKG today shows normal sinus rhythm with a rate of 75 bpm and premature ventricular complexes.  Sinus rhythm and PVCs are new compared to previous EKG from 2018 which showed sinus bradycardia.  Discussed low sugar healthy heart diet.  Continue exercise and current medicines.  Discussed appropriate immunizations.  Also discussed evaluation for mastocytosis.  Diagnoses and all orders for this visit:    1. Hyperlipidemia, unspecified hyperlipidemia type (Primary)    2. Primary hypertension    3. Type 2 diabetes mellitus without complication, without long-term current use of insulin    4. Mastocytosis             Follow Up   No follow-ups on file.  Patient was given instructions and counseling regarding his condition or for health maintenance advice. Please see specific information pulled into the AVS if appropriate.

## 2024-06-13 RX ORDER — LISINOPRIL AND HYDROCHLOROTHIAZIDE 20; 12.5 MG/1; MG/1
TABLET ORAL
Qty: 180 TABLET | Refills: 3 | Status: SHIPPED | OUTPATIENT
Start: 2024-06-13

## 2024-09-11 RX ORDER — PRAVASTATIN SODIUM 20 MG
TABLET ORAL
Qty: 90 TABLET | Refills: 1 | Status: SHIPPED | OUTPATIENT
Start: 2024-09-11

## 2024-10-22 DIAGNOSIS — E78.5 HYPERLIPIDEMIA, UNSPECIFIED HYPERLIPIDEMIA TYPE: ICD-10-CM

## 2024-10-22 DIAGNOSIS — E11.9 TYPE 2 DIABETES MELLITUS WITHOUT COMPLICATION, WITHOUT LONG-TERM CURRENT USE OF INSULIN: Primary | ICD-10-CM

## 2024-10-22 DIAGNOSIS — Z12.5 SCREENING PSA (PROSTATE SPECIFIC ANTIGEN): ICD-10-CM

## 2024-10-22 DIAGNOSIS — I10 PRIMARY HYPERTENSION: ICD-10-CM

## 2024-10-23 LAB
ALBUMIN SERPL-MCNC: 4.1 G/DL (ref 3.5–5.2)
ALBUMIN/GLOB SERPL: 1.5 G/DL
ALP SERPL-CCNC: 42 U/L (ref 39–117)
ALT SERPL-CCNC: 19 U/L (ref 1–41)
AST SERPL-CCNC: 21 U/L (ref 1–40)
BILIRUB SERPL-MCNC: 0.8 MG/DL (ref 0–1.2)
BUN SERPL-MCNC: 22 MG/DL (ref 8–23)
BUN/CREAT SERPL: 17.2 (ref 7–25)
CALCIUM SERPL-MCNC: 9.3 MG/DL (ref 8.6–10.5)
CHLORIDE SERPL-SCNC: 99 MMOL/L (ref 98–107)
CHOLEST SERPL-MCNC: 121 MG/DL (ref 0–200)
CO2 SERPL-SCNC: 29.6 MMOL/L (ref 22–29)
CREAT SERPL-MCNC: 1.28 MG/DL (ref 0.76–1.27)
EGFRCR SERPLBLD CKD-EPI 2021: 56.9 ML/MIN/1.73
GLOBULIN SER CALC-MCNC: 2.7 GM/DL
GLUCOSE SERPL-MCNC: 125 MG/DL (ref 65–99)
HBA1C MFR BLD: 6.2 % (ref 4.8–5.6)
HDLC SERPL-MCNC: 38 MG/DL (ref 40–60)
LDLC SERPL CALC-MCNC: 59 MG/DL (ref 0–100)
LDLC/HDLC SERPL: 1.46 {RATIO}
POTASSIUM SERPL-SCNC: 4.1 MMOL/L (ref 3.5–5.2)
PROT SERPL-MCNC: 6.8 G/DL (ref 6–8.5)
PSA SERPL-MCNC: 1.25 NG/ML (ref 0–4)
SODIUM SERPL-SCNC: 139 MMOL/L (ref 136–145)
TRIGL SERPL-MCNC: 138 MG/DL (ref 0–150)
VLDLC SERPL CALC-MCNC: 24 MG/DL (ref 5–40)

## 2024-10-25 ENCOUNTER — OFFICE VISIT (OUTPATIENT)
Dept: FAMILY MEDICINE CLINIC | Facility: CLINIC | Age: 79
End: 2024-10-25
Payer: MEDICARE

## 2024-10-25 VITALS
DIASTOLIC BLOOD PRESSURE: 66 MMHG | OXYGEN SATURATION: 96 % | HEART RATE: 88 BPM | BODY MASS INDEX: 28.71 KG/M2 | WEIGHT: 212 LBS | RESPIRATION RATE: 16 BRPM | SYSTOLIC BLOOD PRESSURE: 118 MMHG | TEMPERATURE: 98.5 F | HEIGHT: 72 IN

## 2024-10-25 DIAGNOSIS — E11.9 TYPE 2 DIABETES MELLITUS WITHOUT COMPLICATION, WITHOUT LONG-TERM CURRENT USE OF INSULIN: Primary | ICD-10-CM

## 2024-10-25 DIAGNOSIS — Z12.11 COLON CANCER SCREENING: ICD-10-CM

## 2024-10-25 DIAGNOSIS — E78.5 HYPERLIPIDEMIA, UNSPECIFIED HYPERLIPIDEMIA TYPE: ICD-10-CM

## 2024-10-25 DIAGNOSIS — I10 PRIMARY HYPERTENSION: ICD-10-CM

## 2024-10-25 PROCEDURE — 3074F SYST BP LT 130 MM HG: CPT | Performed by: INTERNAL MEDICINE

## 2024-10-25 PROCEDURE — 99214 OFFICE O/P EST MOD 30 MIN: CPT | Performed by: INTERNAL MEDICINE

## 2024-10-25 PROCEDURE — 1126F AMNT PAIN NOTED NONE PRSNT: CPT | Performed by: INTERNAL MEDICINE

## 2024-10-25 PROCEDURE — 3078F DIAST BP <80 MM HG: CPT | Performed by: INTERNAL MEDICINE

## 2024-10-25 NOTE — PROGRESS NOTES
Subjective   Frank J Collesano is a 79 y.o. male. Patient is here today for   Chief Complaint   Patient presents with    Follow-up    Hyperlipidemia          Vitals:    10/25/24 1253   BP: 118/66   Pulse: 88   Resp: 16   Temp: 98.5 °F (36.9 °C)   SpO2: 96%     Body mass index is 28.75 kg/m².      The following portions of the patient's history were reviewed and updated as appropriate: allergies, current medications, past family history, past medical history, past social history, past surgical history and problem list.    Past Medical History:   Diagnosis Date    Allergic Ants    Asthma     Cancer Thyroid    Yes    Cataract 8/2015    Diabetes mellitus     Hyperlipidemia     Hypertension       No Known Allergies   Social History     Socioeconomic History    Marital status: Single   Tobacco Use    Smoking status: Never    Smokeless tobacco: Never   Vaping Use    Vaping status: Never Used   Substance and Sexual Activity    Alcohol use: Not Currently    Drug use: No    Sexual activity: Yes     Partners: Female        Current Outpatient Medications:     EPINEPHrine (EPIPEN) 0.3 MG/0.3ML solution auto-injector injection, , Disp: , Rfl:     levothyroxine (SYNTHROID, LEVOTHROID) 150 MCG tablet, Take 1 tablet by mouth Daily., Disp: , Rfl:     lisinopril-hydrochlorothiazide (PRINZIDE,ZESTORETIC) 20-12.5 MG per tablet, TAKE 1 TABLET TWICE A DAY, Disp: 180 tablet, Rfl: 3    metFORMIN (GLUCOPHAGE) 500 MG tablet, TAKE 1 TABLET TWICE A DAY WITH MEALS, Disp: 180 tablet, Rfl: 3    pravastatin (PRAVACHOL) 20 MG tablet, TAKE 1 TABLET EVERY NIGHT AT BEDTIME, Disp: 90 tablet, Rfl: 1     Objective   History of Present Illness Be is here for a blood pressure check and lab follow-up.  He has hypertension, hypothyroidism, hyperlipidemia, well-controlled type 2 diabetes mellitus, and fire ant allergy.  He feels well.  He continues to eat healthy and exercises vigorously on a daily basis.  His weight is down some from 6 months ago.  He  monitors his blood pressure and reports stable readings.  He is followed by allergy and is on immunotherapy.  He is reluctant to get an RSV vaccine or further COVID vaccines.  He did get a flu vaccine.  He had a colonoscopy 10 years ago.  All of his previous colonoscopies have been normal and he does not want to do another 1.    Review of Systems   Constitutional:  Negative for activity change.   Eyes:         Annual exam   Respiratory: Negative.     Cardiovascular: Negative.    Gastrointestinal: Negative.    Genitourinary: Negative.    Neurological: Negative.    Psychiatric/Behavioral: Negative.         Physical Exam  Vitals reviewed.   Constitutional:       Appearance: Normal appearance.   Neck:      Vascular: No carotid bruit.   Cardiovascular:      Rate and Rhythm: Normal rate and regular rhythm.      Heart sounds: Normal heart sounds.      Comments: 112/68  Pulmonary:      Effort: Pulmonary effort is normal.      Breath sounds: Normal breath sounds.   Musculoskeletal:      Right lower leg: No edema.      Left lower leg: No edema.   Neurological:      Mental Status: He is alert.   Psychiatric:         Mood and Affect: Mood normal.         Behavior: Behavior normal.         Thought Content: Thought content normal.         Judgment: Judgment normal.         Assessment blood pressure is well-controlled.  Hemoglobin A1c is excellent at 6.2.  LDL cholesterol is 59 and HDL cholesterol is low at 38.  Serum creatinine is 1.28.  Prostate-specific antigen is normal.  Continue no sugar healthy heart diet, exercise, current medicines.  Discussed getting a tetanus diphtheria pertussis vaccine.  Will order a Cologuard.  ASSESSMENT    Problems Addressed this Visit          Cardiac and Vasculature    Hyperlipidemia    Hypertension       Endocrine and Metabolic    Diabetes mellitus - Primary    Relevant Orders    Microalbumin / Creatinine Urine Ratio - Urine, Clean Catch     Other Visit Diagnoses       Colon cancer screening         Relevant Orders    Cologuard - Stool, Per Rectum          Diagnoses         Codes Comments    Type 2 diabetes mellitus without complication, without long-term current use of insulin    -  Primary ICD-10-CM: E11.9  ICD-9-CM: 250.00     Hyperlipidemia, unspecified hyperlipidemia type     ICD-10-CM: E78.5  ICD-9-CM: 272.4     Primary hypertension     ICD-10-CM: I10  ICD-9-CM: 401.9     Colon cancer screening     ICD-10-CM: Z12.11  ICD-9-CM: V76.51             PLAN  There are no Patient Instructions on file for this visit.    Return in about 6 months (around 4/25/2025), or Wellness visit, for a1c, cmp, lipid, cbc, tsh.

## 2024-10-26 LAB
ALBUMIN/CREAT UR: 6 MG/G CREAT (ref 0–29)
CREAT UR-MCNC: 127.7 MG/DL
MICROALBUMIN UR-MCNC: 8 UG/ML

## 2025-03-10 RX ORDER — PRAVASTATIN SODIUM 20 MG
20 TABLET ORAL
Qty: 90 TABLET | Refills: 3 | Status: SHIPPED | OUTPATIENT
Start: 2025-03-10

## 2025-05-06 DIAGNOSIS — I10 PRIMARY HYPERTENSION: ICD-10-CM

## 2025-05-06 DIAGNOSIS — E11.9 TYPE 2 DIABETES MELLITUS WITHOUT COMPLICATION, WITHOUT LONG-TERM CURRENT USE OF INSULIN: Primary | ICD-10-CM

## 2025-05-06 DIAGNOSIS — E78.5 HYPERLIPIDEMIA, UNSPECIFIED HYPERLIPIDEMIA TYPE: ICD-10-CM

## 2025-05-06 LAB
ALBUMIN SERPL-MCNC: 4.5 G/DL (ref 3.5–5.2)
ALBUMIN/GLOB SERPL: 1.6 G/DL
ALP SERPL-CCNC: 49 U/L (ref 39–117)
ALT SERPL-CCNC: 22 U/L (ref 1–41)
AST SERPL-CCNC: 21 U/L (ref 1–40)
BASOPHILS # BLD AUTO: 0.03 10*3/MM3 (ref 0–0.2)
BASOPHILS NFR BLD AUTO: 0.7 % (ref 0–1.5)
BILIRUB SERPL-MCNC: 0.8 MG/DL (ref 0–1.2)
BUN SERPL-MCNC: 22 MG/DL (ref 8–23)
BUN/CREAT SERPL: 15.5 (ref 7–25)
CALCIUM SERPL-MCNC: 9.8 MG/DL (ref 8.6–10.5)
CHLORIDE SERPL-SCNC: 96 MMOL/L (ref 98–107)
CHOLEST SERPL-MCNC: 138 MG/DL (ref 0–200)
CO2 SERPL-SCNC: 29.2 MMOL/L (ref 22–29)
CREAT SERPL-MCNC: 1.42 MG/DL (ref 0.76–1.27)
EGFRCR SERPLBLD CKD-EPI 2021: 50 ML/MIN/1.73
EOSINOPHIL # BLD AUTO: 0.1 10*3/MM3 (ref 0–0.4)
EOSINOPHIL NFR BLD AUTO: 2.2 % (ref 0.3–6.2)
ERYTHROCYTE [DISTWIDTH] IN BLOOD BY AUTOMATED COUNT: 12.4 % (ref 12.3–15.4)
GLOBULIN SER CALC-MCNC: 2.8 GM/DL
GLUCOSE SERPL-MCNC: 117 MG/DL (ref 65–99)
HBA1C MFR BLD: 6.4 % (ref 4.8–5.6)
HCT VFR BLD AUTO: 47.7 % (ref 37.5–51)
HDLC SERPL-MCNC: 45 MG/DL (ref 40–60)
HGB BLD-MCNC: 15.5 G/DL (ref 13–17.7)
IMM GRANULOCYTES # BLD AUTO: 0 10*3/MM3 (ref 0–0.05)
IMM GRANULOCYTES NFR BLD AUTO: 0 % (ref 0–0.5)
LDLC SERPL CALC-MCNC: 67 MG/DL (ref 0–100)
LDLC/HDLC SERPL: 1.41 {RATIO}
LYMPHOCYTES # BLD AUTO: 1.7 10*3/MM3 (ref 0.7–3.1)
LYMPHOCYTES NFR BLD AUTO: 36.9 % (ref 19.6–45.3)
MCH RBC QN AUTO: 30.8 PG (ref 26.6–33)
MCHC RBC AUTO-ENTMCNC: 32.5 G/DL (ref 31.5–35.7)
MCV RBC AUTO: 94.6 FL (ref 79–97)
MONOCYTES # BLD AUTO: 0.58 10*3/MM3 (ref 0.1–0.9)
MONOCYTES NFR BLD AUTO: 12.6 % (ref 5–12)
NEUTROPHILS # BLD AUTO: 2.2 10*3/MM3 (ref 1.7–7)
NEUTROPHILS NFR BLD AUTO: 47.6 % (ref 42.7–76)
NRBC BLD AUTO-RTO: 0 /100 WBC (ref 0–0.2)
PLATELET # BLD AUTO: 307 10*3/MM3 (ref 140–450)
POTASSIUM SERPL-SCNC: 4.6 MMOL/L (ref 3.5–5.2)
PROT SERPL-MCNC: 7.3 G/DL (ref 6–8.5)
RBC # BLD AUTO: 5.04 10*6/MM3 (ref 4.14–5.8)
SODIUM SERPL-SCNC: 136 MMOL/L (ref 136–145)
TRIGL SERPL-MCNC: 148 MG/DL (ref 0–150)
TSH SERPL DL<=0.005 MIU/L-ACNC: 1.01 UIU/ML (ref 0.27–4.2)
VLDLC SERPL CALC-MCNC: 26 MG/DL (ref 5–40)
WBC # BLD AUTO: 4.61 10*3/MM3 (ref 3.4–10.8)

## 2025-05-09 ENCOUNTER — OFFICE VISIT (OUTPATIENT)
Dept: FAMILY MEDICINE CLINIC | Facility: CLINIC | Age: 80
End: 2025-05-09
Payer: MEDICARE

## 2025-05-09 VITALS
HEIGHT: 72 IN | DIASTOLIC BLOOD PRESSURE: 56 MMHG | SYSTOLIC BLOOD PRESSURE: 110 MMHG | OXYGEN SATURATION: 98 % | RESPIRATION RATE: 16 BRPM | BODY MASS INDEX: 28.17 KG/M2 | HEART RATE: 78 BPM | WEIGHT: 208 LBS

## 2025-05-09 DIAGNOSIS — Z23 NEED FOR PNEUMOCOCCAL VACCINATION: Primary | ICD-10-CM

## 2025-05-09 NOTE — PROGRESS NOTES
Subjective   The ABCs of the Annual Wellness Visit  Medicare Wellness Visit      Frank J Collesano is a 80 y.o. patient who presents for a Medicare Wellness Visit.    The following portions of the patient's history were reviewed and   updated as appropriate: allergies, current medications, past family history, past medical history, past social history, past surgical history, and problem list.    Compared to one year ago, the patient's physical   health is better.  Compared to one year ago, the patient's mental   health is the same.    Recent Hospitalizations:  He was not admitted to the hospital during the last year.     Current Medical Providers:  Patient Care Team:  Garret Dumont MD as PCP - General (Internal Medicine)  Garret Dumont MD as Referring Physician (Internal Medicine)  Real Das MD as Consulting Physician (Hematology and Oncology)  Sandor Rae MD as Referring Physician (Allergy and Immunology)    Outpatient Medications Prior to Visit   Medication Sig Dispense Refill    EPINEPHrine (EPIPEN) 0.3 MG/0.3ML solution auto-injector injection       levothyroxine (SYNTHROID, LEVOTHROID) 150 MCG tablet Take 1 tablet by mouth Daily.      lisinopril-hydrochlorothiazide (PRINZIDE,ZESTORETIC) 20-12.5 MG per tablet TAKE 1 TABLET TWICE A  tablet 3    metFORMIN (GLUCOPHAGE) 500 MG tablet TAKE 1 TABLET TWICE A DAY WITH MEALS 180 tablet 3    pravastatin (PRAVACHOL) 20 MG tablet TAKE 1 TABLET EVERY NIGHT AT BEDTIME 90 tablet 3     No facility-administered medications prior to visit.     No opioid medication identified on active medication list. I have reviewed chart for other potential  high risk medication/s and harmful drug interactions in the elderly.      Aspirin is not on active medication list.  Aspirin use is not indicated based on review of current medical condition/s. Risk of harm outweighs potential benefits.  .    Patient Active Problem List   Diagnosis    Diabetes mellitus     Working Mammogram report, pt due for mammo. Called, no answer. Left voicemail and sent portal reminder message.    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  ACP discussion was held with the patient during this visit. Patient has an advance directive in EMR which is still valid.             Objective   Vitals:    05/09/25 1306   BP: 110/56   BP Location: Right arm   Patient Position: Sitting   Cuff Size: Adult   Pulse: 78   Resp: 16   SpO2: 98%   Weight: 94.3 kg (208 lb)   Height: 182.9 cm (72.01\")   PainSc: 0-No pain       Estimated body mass index is 28.2 kg/m² as calculated from the following:    Height as of this encounter: 182.9 cm (72.01\").    Weight as of this encounter: 94.3 kg (208 lb).    BMI is >= 25 and <30. (Overweight) The following options were offered after discussion;: exercise counseling/recommendations and nutrition counseling/recommendations           Does the patient have evidence of cognitive impairment? No  Lab Results   Component Value Date    CHLPL 138 05/06/2025    TRIG 148 05/06/2025    HDL 45 05/06/2025    LDL 67 05/06/2025    VLDL 26 05/06/2025    HGBA1C 6.40 (H) 05/06/2025                                                                                                Health  Risk Assessment    Smoking Status:  Social History     Tobacco Use   Smoking Status Never   Smokeless Tobacco Never     Alcohol Consumption:  Social History     Substance and Sexual Activity   Alcohol Use Not Currently       Fall Risk Screen  STEADI Fall Risk Assessment was completed, and patient is at LOW risk for falls.Assessment completed on:5/9/2025    Depression Screening   Little interest or pleasure in doing things? Not at all   Feeling down, depressed, or hopeless? Not at all   PHQ-2 Total Score 0 "      Health Habits and Functional and Cognitive Screenin/2/2025     7:54 AM   Functional & Cognitive Status   Do you have difficulty preparing food and eating? No   Do you have difficulty bathing yourself, getting dressed or grooming yourself? No   Do you have difficulty using the toilet? No   Do you have difficulty moving around from place to place? No   Do you have trouble with steps or getting out of a bed or a chair? No   Current Diet Well Balanced Diet   Dental Exam Up to date   Eye Exam Up to date   Exercise (times per week) 6 times per week   Current Exercises Include Aerobics;Cardiovascular Workout;Walking   Do you need help using the phone?  No   Are you deaf or do you have serious difficulty hearing?  No   Do you need help to go to places out of walking distance? No   Do you need help shopping? No   Do you need help preparing meals?  No   Do you need help with housework?  No   Do you need help with laundry? No   Do you need help taking your medications? No   Do you need help managing money? No   Do you ever drive or ride in a car without wearing a seat belt? No   Have you felt unusual stress, anger or loneliness in the last month? No   Who do you live with? Spouse   If you need help, do you have trouble finding someone available to you? No   Have you been bothered in the last four weeks by sexual problems? Yes   Do you have difficulty concentrating, remembering or making decisions? No           Age-appropriate Screening Schedule:  Refer to the list below for future screening recommendations based on patient's age, sex and/or medical conditions. Orders for these recommended tests are listed in the plan section. The patient has been provided with a written plan.    Health Maintenance List  Health Maintenance   Topic Date Due    RSV Vaccine - Adults (1 - 1-dose 75+ series) 10/25/2025 (Originally 2020)    INFLUENZA VACCINE  2025    URINE MICROALBUMIN-CREATININE RATIO (uACR)  10/25/2025     HEMOGLOBIN A1C  11/06/2025    DIABETIC EYE EXAM  03/12/2026    LIPID PANEL  05/06/2026    ANNUAL WELLNESS VISIT  05/09/2026    DIABETIC FOOT EXAM  05/09/2026    COLORECTAL CANCER SCREENING  10/31/2027    TDAP/TD VACCINES (3 - Td or Tdap) 11/27/2034    Pneumococcal Vaccine 50+  Completed    COVID-19 Vaccine  Discontinued    ZOSTER VACCINE  Discontinued                                                                                                                                                CMS Preventative Services Quick Reference  Risk Factors Identified During Encounter  Immunizations Discussed/Encouraged: Prevnar 20 (Pneumococcal 20-valent conjugate), Shingrix, and COVID19    The above risks/problems have been discussed with the patient.  Pertinent information has been shared with the patient in the After Visit Summary.  An After Visit Summary and PPPS were made available to the patient.    Follow Up:   Next Medicare Wellness visit to be scheduled in 1 year.         Additional E&M Note during same encounter follows:  Patient has additional, significant, and separately identifiable condition(s)/problem(s) that require work above and beyond the Medicare Wellness Visit     Chief Complaint  Medicare Wellness-subsequent, Diabetes, Hyperlipidemia, and Hypertension    Subjective    Diabetes  Hyperlipidemia    Hypertension           The patient is an 80-year-old male here for an annual wellness visit, blood pressure check, and lab follow-up.    He reports a slight improvement in his physical health compared to the previous year, with no changes in his mental health. He has not required hospitalization within the past year. He maintains a regular exercise regimen of 6 days per week and has achieved a weight loss of 4 pounds since his last visit. His diet includes occasional sweets and fruits such as bananas, apples, and peaches, but he does not monitor his blood sugar levels postprandially or at all. He does not possess  a glucometer. He does not consume alcohol, experience chronic pain, exhibit signs of depression, or use illicit substances.    He underwent an eye examination on 03/10/2025, which did not reveal any glaucoma. His hearing is within normal limits. He received a Tdap vaccine on 11/27/2024 and has previously received two pneumonia vaccines. He declines further shingles and COVID-19 vaccinations. He recently visited his dentist and maintains oral hygiene by brushing and flossing three times daily. He has a history of gum disease, which is currently resolved. He visits his dentist biannually. He typically records blood pressure readings around 122/75 to 76.    He has a scheduled dermatology appointment in 09/2025 and uses sunblock when outdoors. He underwent vascular screening two years ago. He consulted with his endocrinologist last month, where his TSH level was found to be 0.29, which is within the normal range but on the lower end. A subsequent test showed an increase to 1.01. His thyroglobulin levels have fluctuated over the years, with readings of 37 several years ago, decreasing to 26 the following year, then to 14.7 last year, and a slight increase to 15.9 this year. His endocrinologist plans to monitor this closely due to the recent increase and has advised blood work every six months instead of annually. He consults with Dr. Lopez, his radiologist, annually in August. He is currently three years clear of cancer.    He experiences mild imbalance during allergy season, which resolves after a few days. He also experiences nasal congestion during allergy season, for which he takes Benadryl. He does not frequently sneeze or blow his nose but does experience occasional nasal drip, which worsens around cats due to a known allergy. He continues to receive monthly venom shots from his allergist. He was advised against a bone marrow biopsy due to the absence of symptoms and was instead recommended to undergo regular blood  "tests. He has no symptoms of any disease and feels well overall. He last saw Dr. Das in 10/2023. He is currently on metformin for diabetes management.    SOCIAL HISTORY  He does not drink alcohol or use drugs.          Objective   Vital Signs:  /56 (BP Location: Right arm, Patient Position: Sitting, Cuff Size: Adult)   Pulse 78   Resp 16   Ht 182.9 cm (72.01\")   Wt 94.3 kg (208 lb)   SpO2 98%   BMI 28.20 kg/m²   Physical Exam  Vitals reviewed.   Constitutional:       Appearance: Normal appearance.   Cardiovascular:      Rate and Rhythm: Normal rate and regular rhythm.      Pulses: Normal pulses.      Heart sounds: Normal heart sounds.   Pulmonary:      Effort: Pulmonary effort is normal.      Breath sounds: Normal breath sounds.   Musculoskeletal:      Right lower leg: No edema.      Left lower leg: No edema.   Neurological:      Mental Status: He is alert.   Psychiatric:         Mood and Affect: Mood normal.         Behavior: Behavior normal.         Thought Content: Thought content normal.         Judgment: Judgment normal.                       Results  Labs   - Glucose: 117 mg/dL   - A1c: 6.4%   - Serum Creatinine: 1.42 mg/dL, 1.28 mg/dL   - White Blood Cell Count: Normal   - Platelet Count: Normal   - Monocyte Count: Slightly elevated   - Cholesterol: 138 mg/dL   - Triglycerides: 148 mg/dL   - HDL: 45 mg/dL   - LDL Cholesterol: 67 mg/dL    Imaging   - Vascular screening of the left carotid artery: 01/2024, A little plaque but no blockage           Assessment and Plan       Diagnoses and all orders for this visit:    1. Need for pneumococcal vaccination (Primary)  -     Pneumococcal Conjugate Vaccine 20-Valent All           1. Annual wellness visit.  - Cognitive function appears intact.  - Continues to exercise regularly, resulting in a weight loss of 4 pounds since the last visit.  - Received Tdap immunization on 11/27/2024.  - Advised to receive the Prevnar 20 vaccine for pneumonia " protection.    2. Blood pressure management.  - Blood pressure readings today are 110/57 and 106/62, which are lower than his usual readings but still within the normal range.  - No signs of hypertension or hypotension.  - Discussed the benefits of maintaining blood pressure <120/80 to reduce future cardiovascular risks.    3. Diabetes Mellitus.  - Glucose level is 117, and A1c has increased to 6.4%.  - Serum creatinine is elevated at 1.42, indicating possible chronic kidney disease.  - Currently taking metformin for diabetes management.    4. Chronic Kidney Disease.  - Serum creatinine is elevated at 1.42, which may be influenced by regular workout routine.  - Microalbumin creatinine ratio was normal 6 months ago.  - Discussed the importance of annual screening for protein in the urine to monitor kidney function.    5. Hyperlipidemia.  - Cholesterol levels are well-managed with pravastatin 20 mg daily.  - Total cholesterol is 138, triglycerides are 148, HDL is 45, and LDL is 67.  - Vascular screening last year showed a little plaque in the left carotid artery but no blockage.    6. Allergic Rhinitis.  - Experiences nasal congestion during allergy season and takes Benadryl, which may cause drowsiness.  - Flonase nasal spray has been recommended as an alternative.    7. Mastocytosis.  - Remains reluctant to undergo a bone marrow biopsy despite minimal symptoms.  - Advised to continue follow-up visits for monitoring blood counts and any evolving symptoms.  - Recommended additional tests including tryptase level, sed rate, and LDH during next lab work.    Follow-up  - The patient will follow up in 6 months.            Follow Up   Return in about 6 months (around 11/9/2025) for lipid, cbc, tsh, psa tryptase, LDH, sed rate, a1c, micro albumin creatinine ratio.  Patient was given instructions and counseling regarding his condition or for health maintenance advice. Please see specific information pulled into the AVS if  appropriate.  Patient or patient representative verbalized consent for the use of Ambient Listening during the visit with  Garret Dumont MD for chart documentation. 5/9/2025  13:47 EDT

## 2025-05-12 ENCOUNTER — TELEPHONE (OUTPATIENT)
Dept: FAMILY MEDICINE CLINIC | Facility: CLINIC | Age: 80
End: 2025-05-12
Payer: MEDICARE

## 2025-05-12 NOTE — TELEPHONE ENCOUNTER
PATIENT CALLED FOR LAB ORDERS FOR HIS UPCOMING PHYSICAL ON 11/4/25    HE WOULD LIKE TO HAVE LABS DONE  A WEEK BEFORE    PLEASE CALL [phone]

## 2025-06-06 RX ORDER — LISINOPRIL AND HYDROCHLOROTHIAZIDE 12.5; 2 MG/1; MG/1
1 TABLET ORAL 2 TIMES DAILY
Qty: 180 TABLET | Refills: 3 | Status: SHIPPED | OUTPATIENT
Start: 2025-06-06